# Patient Record
Sex: MALE | Race: WHITE | NOT HISPANIC OR LATINO | Employment: OTHER | URBAN - METROPOLITAN AREA
[De-identification: names, ages, dates, MRNs, and addresses within clinical notes are randomized per-mention and may not be internally consistent; named-entity substitution may affect disease eponyms.]

---

## 2018-06-22 ENCOUNTER — HOSPITAL ENCOUNTER (EMERGENCY)
Facility: HOSPITAL | Age: 58
Discharge: ANOTHER HOSPITAL | End: 2018-06-22
Attending: INTERNAL MEDICINE

## 2018-06-22 ENCOUNTER — HOSPITAL ENCOUNTER (INPATIENT)
Facility: HOSPITAL | Age: 58
LOS: 2 days | Discharge: HOME | DRG: 683 | End: 2018-06-24
Attending: EMERGENCY MEDICINE | Admitting: HOSPITALIST

## 2018-06-22 VITALS
BODY MASS INDEX: 22.35 KG/M2 | HEIGHT: 72 IN | TEMPERATURE: 98 F | DIASTOLIC BLOOD PRESSURE: 78 MMHG | SYSTOLIC BLOOD PRESSURE: 118 MMHG | HEART RATE: 86 BPM | WEIGHT: 165 LBS | OXYGEN SATURATION: 100 %

## 2018-06-22 DIAGNOSIS — N17.9 ACUTE RENAL FAILURE, UNSPECIFIED ACUTE RENAL FAILURE TYPE: Primary | ICD-10-CM

## 2018-06-22 DIAGNOSIS — I10 ESSENTIAL HYPERTENSION: ICD-10-CM

## 2018-06-22 DIAGNOSIS — M62.82 NON-TRAUMATIC RHABDOMYOLYSIS: ICD-10-CM

## 2018-06-22 DIAGNOSIS — R55 SYNCOPE: ICD-10-CM

## 2018-06-22 DIAGNOSIS — R06.00 DYSPNEA: ICD-10-CM

## 2018-06-22 DIAGNOSIS — N17.9 AKI (ACUTE KIDNEY INJURY): Primary | ICD-10-CM

## 2018-06-22 PROBLEM — D64.9 ANEMIA: Status: ACTIVE | Noted: 2018-06-22

## 2018-06-22 LAB
ABO + RH BLD: NORMAL
ALBUMIN SERPL BCP-MCNC: 4.5 G/DL
ALP SERPL-CCNC: 54 U/L
ALT SERPL W/O P-5'-P-CCNC: 22 U/L
ANION GAP SERPL CALC-SCNC: 18 MMOL/L
ANION GAP SERPL CALC-SCNC: 19 MMOL/L
AST SERPL-CCNC: 19 U/L
BACTERIA #/AREA URNS HPF: ABNORMAL /HPF
BACTERIA #/AREA URNS HPF: NORMAL /HPF
BASOPHILS # BLD AUTO: 0.05 K/UL
BASOPHILS NFR BLD: 0.4 %
BILIRUB SERPL-MCNC: 0.7 MG/DL
BILIRUB UR QL STRIP: ABNORMAL
BILIRUB UR QL STRIP: NEGATIVE
BLD GP AB SCN CELLS X3 SERPL QL: NORMAL
BNP SERPL-MCNC: 12 PG/ML
BUN SERPL-MCNC: 62 MG/DL
BUN SERPL-MCNC: 70 MG/DL
CALCIUM SERPL-MCNC: 10 MG/DL
CALCIUM SERPL-MCNC: 9.1 MG/DL
CHLORIDE SERPL-SCNC: 93 MMOL/L
CHLORIDE SERPL-SCNC: 99 MMOL/L
CK SERPL-CCNC: 346 U/L
CLARITY UR: CLEAR
CLARITY UR: CLEAR
CO2 SERPL-SCNC: 19 MMOL/L
CO2 SERPL-SCNC: 21 MMOL/L
COLOR UR: YELLOW
COLOR UR: YELLOW
CREAT SERPL-MCNC: 6.6 MG/DL
CREAT SERPL-MCNC: 9.2 MG/DL
CREAT UR-MCNC: 218.5 MG/DL
DIFFERENTIAL METHOD: ABNORMAL
EOSINOPHIL # BLD AUTO: 0.2 K/UL
EOSINOPHIL NFR BLD: 1.4 %
ERYTHROCYTE [DISTWIDTH] IN BLOOD BY AUTOMATED COUNT: 12.7 %
EST. GFR  (AFRICAN AMERICAN): 10 ML/MIN/1.73 M^2
EST. GFR  (AFRICAN AMERICAN): 6.6 ML/MIN/1.73 M^2
EST. GFR  (NON AFRICAN AMERICAN): 5.7 ML/MIN/1.73 M^2
EST. GFR  (NON AFRICAN AMERICAN): 9 ML/MIN/1.73 M^2
GLUCOSE SERPL-MCNC: 105 MG/DL
GLUCOSE SERPL-MCNC: 110 MG/DL
GLUCOSE UR QL STRIP: NEGATIVE
GLUCOSE UR QL STRIP: NEGATIVE
HCT VFR BLD AUTO: 34.5 %
HGB BLD-MCNC: 12.2 G/DL
HGB UR QL STRIP: ABNORMAL
HGB UR QL STRIP: NEGATIVE
HYALINE CASTS #/AREA URNS LPF: 0 /LPF
HYALINE CASTS #/AREA URNS LPF: 35 /LPF
IMM GRANULOCYTES # BLD AUTO: 0.06 K/UL
IMM GRANULOCYTES NFR BLD AUTO: 0.5 %
INR PPP: 1.1
KETONES UR QL STRIP: ABNORMAL
KETONES UR QL STRIP: ABNORMAL
LEUKOCYTE ESTERASE UR QL STRIP: NEGATIVE
LEUKOCYTE ESTERASE UR QL STRIP: NEGATIVE
LYMPHOCYTES # BLD AUTO: 2.3 K/UL
LYMPHOCYTES NFR BLD: 20.3 %
MCH RBC QN AUTO: 28.8 PG
MCHC RBC AUTO-ENTMCNC: 35.4 G/DL
MCV RBC AUTO: 81 FL
MICROSCOPIC COMMENT: ABNORMAL
MICROSCOPIC COMMENT: NORMAL
MONOCYTES # BLD AUTO: 1.4 K/UL
MONOCYTES NFR BLD: 12.4 %
NEUTROPHILS # BLD AUTO: 7.3 K/UL
NEUTROPHILS NFR BLD: 65 %
NITRITE UR QL STRIP: NEGATIVE
NITRITE UR QL STRIP: NEGATIVE
NRBC BLD-RTO: 0 /100 WBC
PH UR STRIP: 5 [PH] (ref 5–8)
PH UR STRIP: 6 [PH] (ref 5–8)
PLATELET # BLD AUTO: 294 K/UL
PMV BLD AUTO: 10.8 FL
POTASSIUM SERPL-SCNC: 3.4 MMOL/L
POTASSIUM SERPL-SCNC: 3.8 MMOL/L
PROT SERPL-MCNC: 8.6 G/DL
PROT UR QL STRIP: ABNORMAL
PROT UR QL STRIP: ABNORMAL
PROTHROMBIN TIME: 12.4 SEC
RBC # BLD AUTO: 4.24 M/UL
RBC #/AREA URNS HPF: 2 /HPF (ref 0–4)
RBC #/AREA URNS HPF: 3 /HPF (ref 0–4)
SODIUM SERPL-SCNC: 133 MMOL/L
SODIUM SERPL-SCNC: 136 MMOL/L
SODIUM UR-SCNC: 51 MMOL/L
SP GR UR STRIP: 1.02 (ref 1–1.03)
SP GR UR STRIP: >=1.03 (ref 1–1.03)
SQUAMOUS #/AREA URNS HPF: 1 /HPF
SQUAMOUS #/AREA URNS HPF: 3 /HPF
TSH SERPL DL<=0.005 MIU/L-ACNC: 0.69 UIU/ML
URN SPEC COLLECT METH UR: ABNORMAL
URN SPEC COLLECT METH UR: ABNORMAL
UROBILINOGEN UR STRIP-ACNC: NEGATIVE EU/DL
UROBILINOGEN UR STRIP-ACNC: NEGATIVE EU/DL
WBC # BLD AUTO: 11.3 K/UL
WBC #/AREA URNS HPF: 2 /HPF (ref 0–5)
WBC #/AREA URNS HPF: 2 /HPF (ref 0–5)

## 2018-06-22 PROCEDURE — 96360 HYDRATION IV INFUSION INIT: CPT

## 2018-06-22 PROCEDURE — 25000003 PHARM REV CODE 250: Performed by: INTERNAL MEDICINE

## 2018-06-22 PROCEDURE — 36415 COLL VENOUS BLD VENIPUNCTURE: CPT

## 2018-06-22 PROCEDURE — 71045 X-RAY EXAM CHEST 1 VIEW: CPT | Mod: TC,FY

## 2018-06-22 PROCEDURE — 84156 ASSAY OF PROTEIN URINE: CPT

## 2018-06-22 PROCEDURE — 96361 HYDRATE IV INFUSION ADD-ON: CPT

## 2018-06-22 PROCEDURE — 85610 PROTHROMBIN TIME: CPT

## 2018-06-22 PROCEDURE — 80307 DRUG TEST PRSMV CHEM ANLYZR: CPT

## 2018-06-22 PROCEDURE — 12000002 HC ACUTE/MED SURGE SEMI-PRIVATE ROOM

## 2018-06-22 PROCEDURE — 80053 COMPREHEN METABOLIC PANEL: CPT

## 2018-06-22 PROCEDURE — 80048 BASIC METABOLIC PNL TOTAL CA: CPT | Mod: 91

## 2018-06-22 PROCEDURE — 93005 ELECTROCARDIOGRAM TRACING: CPT

## 2018-06-22 PROCEDURE — 82570 ASSAY OF URINE CREATININE: CPT

## 2018-06-22 PROCEDURE — 81000 URINALYSIS NONAUTO W/SCOPE: CPT

## 2018-06-22 PROCEDURE — 82550 ASSAY OF CK (CPK): CPT | Mod: 91

## 2018-06-22 PROCEDURE — 99285 EMERGENCY DEPT VISIT HI MDM: CPT | Mod: 25

## 2018-06-22 PROCEDURE — 84540 ASSAY OF URINE/UREA-N: CPT

## 2018-06-22 PROCEDURE — 25000003 PHARM REV CODE 250: Performed by: NURSE PRACTITIONER

## 2018-06-22 PROCEDURE — 85025 COMPLETE CBC W/AUTO DIFF WBC: CPT

## 2018-06-22 PROCEDURE — 83880 ASSAY OF NATRIURETIC PEPTIDE: CPT

## 2018-06-22 PROCEDURE — 80048 BASIC METABOLIC PNL TOTAL CA: CPT

## 2018-06-22 PROCEDURE — 99285 EMERGENCY DEPT VISIT HI MDM: CPT | Mod: 25,27

## 2018-06-22 PROCEDURE — 84300 ASSAY OF URINE SODIUM: CPT

## 2018-06-22 PROCEDURE — 86901 BLOOD TYPING SEROLOGIC RH(D): CPT

## 2018-06-22 PROCEDURE — 25000003 PHARM REV CODE 250: Performed by: EMERGENCY MEDICINE

## 2018-06-22 PROCEDURE — 84443 ASSAY THYROID STIM HORMONE: CPT

## 2018-06-22 PROCEDURE — 82272 OCCULT BLD FECES 1-3 TESTS: CPT

## 2018-06-22 PROCEDURE — 81000 URINALYSIS NONAUTO W/SCOPE: CPT | Mod: 91

## 2018-06-22 PROCEDURE — 83935 ASSAY OF URINE OSMOLALITY: CPT

## 2018-06-22 PROCEDURE — 71045 X-RAY EXAM CHEST 1 VIEW: CPT | Mod: 26,,, | Performed by: RADIOLOGY

## 2018-06-22 PROCEDURE — 82550 ASSAY OF CK (CPK): CPT

## 2018-06-22 RX ORDER — ONDANSETRON 4 MG/1
8 TABLET, ORALLY DISINTEGRATING ORAL EVERY 8 HOURS PRN
Status: DISCONTINUED | OUTPATIENT
Start: 2018-06-22 | End: 2018-06-24 | Stop reason: HOSPADM

## 2018-06-22 RX ORDER — SODIUM CHLORIDE, SODIUM LACTATE, POTASSIUM CHLORIDE, CALCIUM CHLORIDE 600; 310; 30; 20 MG/100ML; MG/100ML; MG/100ML; MG/100ML
1000 INJECTION, SOLUTION INTRAVENOUS
Status: COMPLETED | OUTPATIENT
Start: 2018-06-22 | End: 2018-06-22

## 2018-06-22 RX ORDER — LISINOPRIL AND HYDROCHLOROTHIAZIDE 10; 12.5 MG/1; MG/1
1 TABLET ORAL 2 TIMES DAILY
Status: ON HOLD | COMMUNITY
End: 2018-06-24 | Stop reason: HOSPADM

## 2018-06-22 RX ORDER — SODIUM CHLORIDE 9 MG/ML
INJECTION, SOLUTION INTRAVENOUS CONTINUOUS
Status: DISCONTINUED | OUTPATIENT
Start: 2018-06-22 | End: 2018-06-24 | Stop reason: HOSPADM

## 2018-06-22 RX ORDER — ACETAMINOPHEN 325 MG/1
650 TABLET ORAL EVERY 6 HOURS PRN
Status: DISCONTINUED | OUTPATIENT
Start: 2018-06-22 | End: 2018-06-24 | Stop reason: HOSPADM

## 2018-06-22 RX ORDER — SODIUM CHLORIDE 9 MG/ML
1000 INJECTION, SOLUTION INTRAVENOUS
Status: COMPLETED | OUTPATIENT
Start: 2018-06-22 | End: 2018-06-22

## 2018-06-22 RX ORDER — IPRATROPIUM BROMIDE AND ALBUTEROL SULFATE 2.5; .5 MG/3ML; MG/3ML
3 SOLUTION RESPIRATORY (INHALATION) EVERY 4 HOURS PRN
Status: DISCONTINUED | OUTPATIENT
Start: 2018-06-22 | End: 2018-06-24 | Stop reason: HOSPADM

## 2018-06-22 RX ORDER — SODIUM CHLORIDE 0.9 % (FLUSH) 0.9 %
5 SYRINGE (ML) INJECTION
Status: DISCONTINUED | OUTPATIENT
Start: 2018-06-22 | End: 2018-06-24 | Stop reason: HOSPADM

## 2018-06-22 RX ORDER — AMLODIPINE BESYLATE 5 MG/1
5 TABLET ORAL DAILY
Status: ON HOLD | COMMUNITY
End: 2018-06-24 | Stop reason: HOSPADM

## 2018-06-22 RX ORDER — AMOXICILLIN 250 MG
1 CAPSULE ORAL 2 TIMES DAILY PRN
Status: DISCONTINUED | OUTPATIENT
Start: 2018-06-22 | End: 2018-06-24 | Stop reason: HOSPADM

## 2018-06-22 RX ORDER — RAMELTEON 8 MG/1
8 TABLET ORAL NIGHTLY PRN
Status: DISCONTINUED | OUTPATIENT
Start: 2018-06-22 | End: 2018-06-24 | Stop reason: HOSPADM

## 2018-06-22 RX ORDER — METOPROLOL TARTRATE 50 MG/1
50 TABLET ORAL 2 TIMES DAILY
Status: ON HOLD | COMMUNITY
End: 2018-06-24 | Stop reason: HOSPADM

## 2018-06-22 RX ADMIN — SODIUM CHLORIDE 1000 ML: 0.9 INJECTION, SOLUTION INTRAVENOUS at 03:06

## 2018-06-22 RX ADMIN — SODIUM CHLORIDE, SODIUM LACTATE, POTASSIUM CHLORIDE, AND CALCIUM CHLORIDE 1000 ML: .6; .31; .03; .02 INJECTION, SOLUTION INTRAVENOUS at 08:06

## 2018-06-22 RX ADMIN — SODIUM CHLORIDE 1000 ML: 0.9 INJECTION, SOLUTION INTRAVENOUS at 05:06

## 2018-06-22 RX ADMIN — SODIUM CHLORIDE: 0.9 INJECTION, SOLUTION INTRAVENOUS at 10:06

## 2018-06-22 NOTE — ED NOTES
[facility] ED to ED transfer.

## 2018-06-22 NOTE — ED PROVIDER NOTES
Encounter Date: 6/22/2018       History     Chief Complaint   Patient presents with    States he has been blacking out off and on for about a week     This patient has been having severe weakness for approximately 1 week with postural hypotension symptoms as well as syncope with loss of consciousness he states he has been markedly dyspneic on exertion and weak and can barely walk across a room 40 feels like is going to pass out.  He has not had any black tarry stools although he has been taking ibuprofen daily for some period of time for various aches and pains.  He takes up to 4-6 ibuprofen per day.  He has no history of any cardiac history of problems and he has not had any chest pain is not experience any palpitations or arrhythmias and no other cardiac symptoms. He has no history of muscular disease and although his muscles give out easily he feels like this is more of a weakness. He has not had any melena by history.  He does have hypertension and takes a medication for his hypertension but he is not sure of the name.          Review of patient's allergies indicates:  No Known Allergies  Past Medical History:   Diagnosis Date    Hypertension      History reviewed. No pertinent surgical history.  History reviewed. No pertinent family history.  Social History   Substance Use Topics    Smoking status: Current Every Day Smoker    Smokeless tobacco: Not on file    Alcohol use No     Review of Systems   Constitutional: Positive for appetite change (He has had a slight change in appetite but he has not lost any significant amount await he has felt nauseous recently) and fatigue ( he also reports severe fatigue but the fatigue is more accurately described as weakness with exertion). Negative for fever.   HENT: Negative for sore throat.    Respiratory: Positive for shortness of breath.    Cardiovascular: Negative for chest pain.   Gastrointestinal: Negative for nausea.   Genitourinary: Negative for dysuria.    Musculoskeletal: Negative for back pain.   Skin: Negative for rash.   Neurological: Positive for syncope ( complete syncopal episodes at least 3 occasions with loss of consciousness he did not have any epileptic or other seizure-like activity and several of these will witnessed once he passes out he is knees with the knee and on the ground immediately wakes up) and light-headedness. Negative for weakness.   Hematological: Does not bruise/bleed easily.   All other systems reviewed and are negative.      Physical Exam     Initial Vitals [06/22/18 1404]   BP Pulse Resp Temp SpO2   100/68 (!) 111 20 98.4 °F (36.9 °C) 99 %      MAP       --         Physical Exam    Nursing note and vitals reviewed.  Constitutional: Vital signs are normal. He appears well-developed and well-nourished. He is active and cooperative.   HENT:   Head: Normocephalic and atraumatic.   Eyes: Conjunctivae and lids are normal. Lids are everted and swept, no foreign bodies found.   Neck: Trachea normal, normal range of motion and full passive range of motion without pain. Neck supple.   Cardiovascular: Regular rhythm, S1 normal, S2 normal, normal heart sounds, intact distal pulses and normal pulses.  No extrasystoles are present. Exam reveals no gallop and no friction rub.    No murmur heard.  Patient is tachycardic and slightly hypotensive   Pulmonary/Chest: Breath sounds normal.   Abdominal: Soft. Normal appearance and bowel sounds are normal.   Genitourinary: Rectal exam shows guaiac positive stool (Patient had an attempted guaiac however there was no stool in the rectal vault and he had no from normal prostate appear there was no evidence of bleeding and the maria ines anal area). Guaiac positive stool (Patient had an attempted guaiac however there was no stool in the rectal vault and he had no from normal prostate appear there was no evidence of bleeding and the maria ines anal area). : Acceptable.  Musculoskeletal: Normal range of  motion.   Neurological: He is alert and oriented to person, place, and time. He has normal strength and normal reflexes. GCS eye subscore is 4. GCS verbal subscore is 5. GCS motor subscore is 6.   Skin: Skin is warm, dry and intact. Capillary refill takes less than 2 seconds. There is pallor.   Psychiatric: He has a normal mood and affect. His speech is normal and behavior is normal. Judgment and thought content normal. Cognition and memory are normal.         ED Course   Procedures  Labs Reviewed   CBC W/ AUTO DIFFERENTIAL - Abnormal; Notable for the following:        Result Value    RBC 4.24 (*)     Hemoglobin 12.2 (*)     Hematocrit 34.5 (*)     MCV 81 (*)     Immature Grans (Abs) 0.06 (*)     Mono # 1.4 (*)     All other components within normal limits   COMPREHENSIVE METABOLIC PANEL - Abnormal; Notable for the following:     Sodium 133 (*)     Potassium 3.4 (*)     Chloride 93 (*)     CO2 21 (*)     BUN, Bld 70 (*)     Creatinine 9.2 (*)     Total Protein 8.6 (*)     Alkaline Phosphatase 54 (*)     Anion Gap 19 (*)     eGFR if  6.6 (*)     eGFR if non  5.7 (*)     All other components within normal limits   URINALYSIS, REFLEX TO URINE CULTURE - Abnormal; Notable for the following:     Specific Gravity, UA >=1.030 (*)     Protein, UA 2+ (*)     Ketones, UA 1+ (*)     Bilirubin (UA) 1+ (*)     All other components within normal limits    Narrative:     Preferred Collection Type->Urine, Clean Catch   B-TYPE NATRIURETIC PEPTIDE   PROTIME-INR   TSH   URINALYSIS MICROSCOPIC    Narrative:     Preferred Collection Type->Urine, Clean Catch   CK   TYPE & SCREEN     EKG Readings: (Independently Interpreted)   Initial Reading: No STEMI. Rhythm: Sinus Tachycardia. Ectopy: No Ectopy. Conduction: Normal. ST Segments: Normal ST Segments. T Waves: Normal. Axis: Normal.       Imaging Results    None                            ED Course as of Jun 22 1721 Fri Jun 22, 2018   1703 Further history is  patient has been working outside and overheated for a while. He tries to stay hydrated and did not feel behind on fluids. [facility] transfer called at 1700 for nephrology referral and transfer.   [SO]   1704 Patient explained situation and need for transfer. Agreeable.   [SO]      ED Course User Index  [SO] Jani Meyer MD     Clinical Impression:   The primary encounter diagnosis was Acute renal failure, unspecified acute renal failure type. Diagnoses of Dyspnea and Non-traumatic rhabdomyolysis were also pertinent to this visit.                             Jani Meyer MD  06/22/18 7894

## 2018-06-23 PROBLEM — I50.30 DIASTOLIC HEART FAILURE: Status: ACTIVE | Noted: 2018-06-23

## 2018-06-23 LAB
ALBUMIN SERPL BCP-MCNC: 3.3 G/DL
ALP SERPL-CCNC: 51 U/L
ALT SERPL W/O P-5'-P-CCNC: 14 U/L
AMPHET+METHAMPHET UR QL: NORMAL
ANION GAP SERPL CALC-SCNC: 11 MMOL/L
ANION GAP SERPL CALC-SCNC: 11 MMOL/L
ANION GAP SERPL CALC-SCNC: 12 MMOL/L
ANION GAP SERPL CALC-SCNC: 7 MMOL/L
AORTIC ROOT ANNULUS: 3.09 CM
AORTIC VALVE CUSP SEPERATION: 1.92 CM
AST SERPL-CCNC: 13 U/L
AV MEAN GRADIENT: 5.15 MMHG
AV PEAK GRADIENT: 8.23 MMHG
AV VALVE AREA: 2.06 CM2
BARBITURATES UR QL SCN>200 NG/ML: NEGATIVE
BASOPHILS # BLD AUTO: 0 K/UL
BASOPHILS NFR BLD: 0.5 %
BENZODIAZ UR QL SCN>200 NG/ML: NEGATIVE
BILIRUB SERPL-MCNC: 0.5 MG/DL
BSA FOR ECHO PROCEDURE: 1.91 M2
BUN SERPL-MCNC: 50 MG/DL
BUN SERPL-MCNC: 61 MG/DL
BUN SERPL-MCNC: 61 MG/DL
BUN SERPL-MCNC: 64 MG/DL
BZE UR QL SCN: NEGATIVE
CALCIUM SERPL-MCNC: 8.5 MG/DL
CALCIUM SERPL-MCNC: 8.6 MG/DL
CANNABINOIDS UR QL SCN: NEGATIVE
CHLORIDE SERPL-SCNC: 102 MMOL/L
CHLORIDE SERPL-SCNC: 105 MMOL/L
CHLORIDE SERPL-SCNC: 105 MMOL/L
CHLORIDE SERPL-SCNC: 108 MMOL/L
CK SERPL-CCNC: 184 U/L
CK SERPL-CCNC: 212 U/L
CK SERPL-CCNC: 239 U/L
CO2 SERPL-SCNC: 23 MMOL/L
CO2 SERPL-SCNC: 24 MMOL/L
CREAT SERPL-MCNC: 2 MG/DL
CREAT SERPL-MCNC: 3.3 MG/DL
CREAT SERPL-MCNC: 3.3 MG/DL
CREAT SERPL-MCNC: 4.8 MG/DL
CREAT UR-MCNC: 219 MG/DL
CREAT UR-MCNC: 219 MG/DL
CV ECHO LV RWT: 0.36 CM
DIFFERENTIAL METHOD: ABNORMAL
DOP CALC AO PEAK VEL: 1.43 M/S
DOP CALC AO VTI: 26.5 CM
DOP CALC LVOT AREA: 2.52 CM2
DOP CALC LVOT DIAMETER: 1.79 CM
DOP CALC LVOT STROKE VOLUME: 54.48 CM3
DOP CALCLVOT PEAK VEL VTI: 21.66 CM
E WAVE DECELERATION TIME: 267.34 MSEC
E/A RATIO: 0.88
E/E' RATIO: 6.86
ECHO LV POSTERIOR WALL: 0.91 CM (ref 0.6–1.1)
EOSINOPHIL # BLD AUTO: 0.2 K/UL
EOSINOPHIL NFR BLD: 3.1 %
ERYTHROCYTE [DISTWIDTH] IN BLOOD BY AUTOMATED COUNT: 13.5 %
EST. GFR  (AFRICAN AMERICAN): 15 ML/MIN/1.73 M^2
EST. GFR  (AFRICAN AMERICAN): 23 ML/MIN/1.73 M^2
EST. GFR  (AFRICAN AMERICAN): 23 ML/MIN/1.73 M^2
EST. GFR  (AFRICAN AMERICAN): 42 ML/MIN/1.73 M^2
EST. GFR  (NON AFRICAN AMERICAN): 13 ML/MIN/1.73 M^2
EST. GFR  (NON AFRICAN AMERICAN): 20 ML/MIN/1.73 M^2
EST. GFR  (NON AFRICAN AMERICAN): 20 ML/MIN/1.73 M^2
EST. GFR  (NON AFRICAN AMERICAN): 36 ML/MIN/1.73 M^2
ESTIMATED AVG GLUCOSE: 126 MG/DL
ETHANOL UR-MCNC: <10 MG/DL
FERRITIN SERPL-MCNC: 250 NG/ML
FRACTIONAL SHORTENING: 39 % (ref 28–44)
GLUCOSE SERPL-MCNC: 143 MG/DL
GLUCOSE SERPL-MCNC: 85 MG/DL
GLUCOSE SERPL-MCNC: 88 MG/DL
GLUCOSE SERPL-MCNC: 88 MG/DL
HBA1C MFR BLD HPLC: 6 %
HCT VFR BLD AUTO: 30.6 %
HGB BLD-MCNC: 10.2 G/DL
INTERVENTRICULAR SEPTUM: 0.85 CM (ref 0.6–1.1)
IRON SERPL-MCNC: 40 UG/DL
IVRT: 0.06 MSEC
LEFT ATRIUM SIZE: 4.03 CM
LEFT INTERNAL DIMENSION IN SYSTOLE: 2.96 CM (ref 2.1–4)
LEFT VENTRICLE MASS INDEX: 77.2 G/M2
LEFT VENTRICULAR INTERNAL DIMENSION IN DIASTOLE: 4.88 CM (ref 3.5–6)
LEFT VENTRICULAR MASS: 147.48 G
LV LATERAL E/E' RATIO: 6.55
LV SEPTAL E/E' RATIO: 7.2
LYMPHOCYTES # BLD AUTO: 1.9 K/UL
LYMPHOCYTES NFR BLD: 29.9 %
MAGNESIUM SERPL-MCNC: 2.2 MG/DL
MCH RBC QN AUTO: 28.6 PG
MCHC RBC AUTO-ENTMCNC: 33.5 G/DL
MCV RBC AUTO: 85 FL
METHADONE UR QL SCN>300 NG/ML: NEGATIVE
MONOCYTES # BLD AUTO: 1 K/UL
MONOCYTES NFR BLD: 15.9 %
MV PEAK A VEL: 0.82 M/S
MV PEAK E VEL: 0.72 M/S
MV STENOSIS PRESSURE HALF TIME: 77.53 MS
MV VALVE AREA P 1/2 METHOD: 2.84 CM2
NEUTROPHILS # BLD AUTO: 3.3 K/UL
NEUTROPHILS NFR BLD: 50.6 %
OPIATES UR QL SCN: NEGATIVE
OSMOLALITY UR: 317 MOSM/KG
PCP UR QL SCN>25 NG/ML: NEGATIVE
PHOSPHATE SERPL-MCNC: 3.7 MG/DL
PISA TR MAX VEL: 2.58 M/S
PLATELET # BLD AUTO: 235 K/UL
PMV BLD AUTO: 8.1 FL
POTASSIUM SERPL-SCNC: 3.1 MMOL/L
POTASSIUM SERPL-SCNC: 3.5 MMOL/L
POTASSIUM SERPL-SCNC: 3.5 MMOL/L
POTASSIUM SERPL-SCNC: 3.9 MMOL/L
PROT SERPL-MCNC: 6.4 G/DL
PROT UR-MCNC: 45 MG/DL
PROT/CREAT RATIO, UR: 0.21
PV PEAK GRADIENT: 5.74 MMHG
PV PEAK VELOCITY: 1.2 CM/S
RBC # BLD AUTO: 3.58 M/UL
RIGHT VENTRICULAR END-DIASTOLIC DIMENSION: 2.45 CM
SATURATED IRON: 15 %
SODIUM SERPL-SCNC: 137 MMOL/L
SODIUM SERPL-SCNC: 139 MMOL/L
TDI LATERAL: 0.11
TDI SEPTAL: 0.1
TDI: 0.11
TOTAL IRON BINDING CAPACITY: 265 UG/DL
TOXICOLOGY INFORMATION: NORMAL
TR MAX PG: 26.63 MMHG
TRANSFERRIN SERPL-MCNC: 179 MG/DL
TRICUSPID ANNULAR PLANE SYSTOLIC EXCURSION: 0.03 CM
UUN UR-MCNC: 335 MG/DL
WBC # BLD AUTO: 6.5 K/UL

## 2018-06-23 PROCEDURE — 36415 COLL VENOUS BLD VENIPUNCTURE: CPT

## 2018-06-23 PROCEDURE — 84100 ASSAY OF PHOSPHORUS: CPT

## 2018-06-23 PROCEDURE — 12000002 HC ACUTE/MED SURGE SEMI-PRIVATE ROOM

## 2018-06-23 PROCEDURE — 80048 BASIC METABOLIC PNL TOTAL CA: CPT

## 2018-06-23 PROCEDURE — 94761 N-INVAS EAR/PLS OXIMETRY MLT: CPT

## 2018-06-23 PROCEDURE — 83735 ASSAY OF MAGNESIUM: CPT

## 2018-06-23 PROCEDURE — 83036 HEMOGLOBIN GLYCOSYLATED A1C: CPT

## 2018-06-23 PROCEDURE — 25000003 PHARM REV CODE 250: Performed by: NURSE PRACTITIONER

## 2018-06-23 PROCEDURE — 82728 ASSAY OF FERRITIN: CPT

## 2018-06-23 PROCEDURE — 80053 COMPREHEN METABOLIC PANEL: CPT

## 2018-06-23 PROCEDURE — 99900035 HC TECH TIME PER 15 MIN (STAT)

## 2018-06-23 PROCEDURE — 85025 COMPLETE CBC W/AUTO DIFF WBC: CPT

## 2018-06-23 PROCEDURE — 82550 ASSAY OF CK (CPK): CPT

## 2018-06-23 PROCEDURE — 83540 ASSAY OF IRON: CPT

## 2018-06-23 RX ORDER — POTASSIUM CHLORIDE 20 MEQ/1
40 TABLET, EXTENDED RELEASE ORAL ONCE
Status: COMPLETED | OUTPATIENT
Start: 2018-06-23 | End: 2018-06-23

## 2018-06-23 RX ADMIN — POTASSIUM CHLORIDE 40 MEQ: 1500 TABLET, EXTENDED RELEASE ORAL at 01:06

## 2018-06-23 NOTE — ED PROVIDER NOTES
"Encounter Date: 6/22/2018    SCRIBE #1 NOTE: I, Brandon Arnett, am scribing for, and in the presence of, Dr. Mcclellan .       History     Chief Complaint   Patient presents with    Loss of Consciousness     C/o syncopal episodes x 1 week. Transferred here from [facility] for Nephrology.      Time seen by provider: 7:29 PM on 06/22/2018    Chief Complaint: ROSETTA Mathur is a 57 y.o. male with a PMHx of HTN who presents to the ED via EMS as a transfer from [facility] for TIN. Patient reports that for the last week he has been having syncopal episodes at his home for the last week and has let it gone unseen until last night with a friend. 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 He admits that he attempts to rehydrate with Powerade and water, but it is not enough. Patient admits that he has been taking 4-5 more ibuprofen daily within the last week. He takes lisinopril and hydrochlorothiazide for his HTN. Patient states that he has found that his urine is darker and it is less in output. Patient states that he went to [place], when he was diagnosed with TIN due to his pertinent labs being a BUN 70, creatine 9.2, GFR 5.7, and a CPK of 346. Patient reports that he received 2 liters of fluid at [place]. He denies chest pain, SOB, abdominal pain, nausea, vomiting, and diarrhea.       The history is provided by the patient.     Review of patient's allergies indicates:  No Known Allergies  Past Medical History:   Diagnosis Date    Hypertension      History reviewed. No pertinent surgical history.  Family History   Problem Relation Age of Onset    Cancer Mother     Diabetes Mother     Hypertension Mother     Cancer Father     Diabetes Father     Hypertension Father     Kidney disease Father      Social History   Substance Use Topics    Smoking status: Current Every Day Smoker    Smokeless tobacco: Not on file    " Alcohol use No     Review of Systems  REVIEW OF SYSTEMS  CONSTITUTIONAL: Negative for fever.  HEENT:  Negative for sore throat.   HEART:   Negative for chest pain..  LUNG:  Negative for shortness of breath.  ABDOMEN:  Negative for nausea, vomiting, diarrhea, and abdominal pain.    :  No discharge, dysuria. +decreased urine output and hesitancy   EXTREMITIES:  No swelling  NEURO:  Negative for weakness.   SKIN:  Negative for rash.  Psych: No depression  HEME: Does not bruise/bleed easily.           Physical Exam     Initial Vitals   BP Pulse Resp Temp SpO2   06/22/18 0859 06/22/18 0859 06/22/18 1850 06/22/18 1902 06/22/18 1850   (!) 117/4 84 18 97.5 °F (36.4 °C) 100 %      MAP       --                Physical Exam    Constitutional: He appears well-developed and well-nourished.  Non-toxic appearance. No distress.   HENT:   Head: Normocephalic and atraumatic.   Mouth/Throat: Mucous membranes are dry.   Slightly dry    Eyes: EOM are normal. Pupils are equal, round, and reactive to light.   Neck: Normal range of motion. Neck supple. No neck rigidity. No JVD present.   Cardiovascular: Normal rate, regular rhythm, normal heart sounds and intact distal pulses. Exam reveals no gallop and no friction rub.    No murmur heard.  Pulmonary/Chest: Breath sounds normal. He has no wheezes. He has no rhonchi. He has no rales.   Abdominal: Soft. Bowel sounds are normal. He exhibits no distension. There is no tenderness. There is no rigidity, no rebound and no guarding.   Musculoskeletal: Normal range of motion.   Neurological: He is alert and oriented to person, place, and time. He has normal strength and normal reflexes. No cranial nerve deficit or sensory deficit. He exhibits normal muscle tone. Coordination normal. GCS eye subscore is 4. GCS verbal subscore is 5. GCS motor subscore is 6.   Skin: Skin is warm and dry.   Psychiatric: He has a normal mood and affect. His speech is normal and behavior is normal. He is not actively  hallucinating.         ED Course   Procedures  Labs Reviewed   BASIC METABOLIC PANEL - Abnormal; Notable for the following:        Result Value    CO2 19 (*)     BUN, Bld 62 (*)     Creatinine 6.6 (*)     Anion Gap 18 (*)     eGFR if  10 (*)     eGFR if non  9 (*)     All other components within normal limits   URINALYSIS - Abnormal; Notable for the following:     Protein, UA Trace (*)     Ketones, UA Trace (*)     Occult Blood UA 1+ (*)     All other components within normal limits   URINALYSIS MICROSCOPIC - Abnormal; Notable for the following:     Hyaline Casts, UA 35 (*)     All other components within normal limits   CREATININE, URINE, RANDOM    Narrative:     Please add on to UA if possible   SODIUM, URINE, RANDOM    Narrative:     Please add on to UA if possible   UREA NITROGEN, URINE, RANDOM   BASIC METABOLIC PANEL   CK   TOXICOLOGY SCREEN, URINE, RANDOM (COMPLIANCE)   PROTEIN / CREATININE RATIO, URINE   OSMOLALITY, URINE RANDOM     EKG Readings: (Independently Interpreted)   NSR at 77 bpm with LVH. Normal axis and ST segments        Imaging Results          US Retroperitoneal Complete (Kidney and (In process)                  Medical Decision Making:   History:   Old Medical Records: I decided to obtain old medical records.  Initial Assessment:   Patient is a 57-year-old man transferred from [place] for evaluation Nephrology for acute kidney insufficiency.  Patient had elevated BUN of 70 and creatinine of 9.  Sodium 133 with no evidence of hyperkalemia.  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  He is given 2 L of normal saline at [place] prior to transfer.  Repeat creatinine is 6.6 with BUN of 62.  Discussed with the hospitalist who agrees to admit the patient for IV fluid hydration and evaluation of his a KI,  Likely  multifactorial.  Clinical Tests:   Lab Tests: Ordered and Reviewed            Scribe Attestation:   Scribe #1: I performed the above scribed service and the documentation accurately describes the services I performed. I attest to the accuracy of the note.    I, Eleuterio Gary, personally performed the services described in this documentation. All medical record entries made by the scribe were at my direction and in my presence.  I have reviewed the chart and agree that the record reflects my personal performance and is accurate and complete. Derek Mcclellan MD.  10:18 PM 06/22/2018             Clinical Impression:   The primary encounter diagnosis was TIN (acute kidney injury). A diagnosis of Syncope was also pertinent to this visit.      Disposition:   Disposition: Admitted                        Derek Mcclellan MD  06/22/18 4708

## 2018-06-23 NOTE — H&P
[facility] Hospital Medicine  History & Physical    Patient Name: Alphonso Mathur  MRN: [id number]  Admission Date: 6/22/2018  Attending Physician: Derek Mcclellan MD   Primary Care Provider: Primary Doctor No         Patient information was obtained from patient and ER records.     Subjective:     Principal Problem:TIN (acute kidney injury)    Chief Complaint:   Chief Complaint   Patient presents with    Loss of Consciousness     C/o syncopal episodes x 1 week. Transferred here from [facility] for Nephrology.         HPI: 58 y/o gentleman, who was transferred from [facility] to [facility] d/t TIN and syncope.  He has a PMH of HTN.  He reports over the past week he has been feeling fatigued and lightheaded.  He endorses dizziness and near syncope with position changes over the same period of time.  Additionally, over the past two days he has had two witnessed syncopal events.  He denies recent illness, fever, chills, CP, N/V/D.  He endorses SOB with activity, fatigue, decreased urine production, and dark concentrated urine.  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  He states that he consumes 1-2 poweraides daily, but states that he does not drink much water.  Additionally, he reports increasing ibuprofen usage recently for aches and pains.  Initial workup reveals afebrile, no leukocytosis, Crt 9.2, BUN 70, , K 3.4.  He received 2L fluid challenge while at [facility] and one additional liter of LR while in the ER.  Repeat labs Crt 6.6, BUN 62.     Past Medical History:   Diagnosis Date    Hypertension        History reviewed. No pertinent surgical history.    Review of patient's allergies indicates:  No Known Allergies    Current Facility-Administered Medications on File Prior to Encounter   Medication    [COMPLETED] 0.9%  NaCl infusion    [COMPLETED] sodium chloride 0.9% bolus 1,000 mL     No current outpatient  prescriptions on file prior to encounter.     Family History     Problem Relation (Age of Onset)    Cancer Mother, Father    Diabetes Mother, Father    Hypertension Mother, Father    Kidney disease Father        Social History Main Topics    Smoking status: Current Every Day Smoker    Smokeless tobacco: Not on file    Alcohol use No    Drug use: No    Sexual activity: Not Currently     Review of Systems   Constitutional: Positive for fatigue. Negative for chills and fever.   HENT: Negative for congestion and sore throat.    Eyes: Negative for discharge and visual disturbance.   Respiratory: Positive for shortness of breath. Negative for cough and chest tightness.    Cardiovascular: Negative for chest pain, palpitations and leg swelling.   Gastrointestinal: Negative for abdominal distention, abdominal pain, diarrhea, nausea and vomiting.   Genitourinary: Positive for decreased urine volume. Negative for dysuria.        Reports dark concentrated urine    Musculoskeletal: Negative for back pain and joint swelling.   Skin: Negative.    Neurological: Positive for dizziness and light-headedness. Negative for seizures and syncope.   Psychiatric/Behavioral: Positive for confusion and decreased concentration.     Objective:     Vital Signs (Most Recent):  Temp: 97.5 °F (36.4 °C) (06/22/18 1902)  Pulse: 78 (06/22/18 1908)  Resp: 18 (06/22/18 1850)  BP: 136/80 (06/22/18 1903)  SpO2: 100 % (06/22/18 1908) Vital Signs (24h Range):  Temp:  [97.5 °F (36.4 °C)-98.4 °F (36.9 °C)] 97.5 °F (36.4 °C)  Pulse:  [] 78  Resp:  [0-20] 18  SpO2:  [97 %-100 %] 100 %  BP: (100-136)/(68-80) 136/80     Weight: 78.5 kg (173 lb)  Body mass index is 23.46 kg/m².    Physical Exam   Constitutional: He is oriented to person, place, and time. He appears well-developed and well-nourished. No distress.   HENT:   Head: Normocephalic and atraumatic.   Eyes: EOM are normal. Pupils are equal, round, and reactive to light.   Neck: Normal range of  motion. Neck supple.   Cardiovascular: Normal rate, regular rhythm, normal heart sounds and intact distal pulses.    No murmur heard.  Pulmonary/Chest: Effort normal and breath sounds normal. No respiratory distress. He has no wheezes. He has no rales.   Abdominal: Soft. Bowel sounds are normal. He exhibits no distension. There is no tenderness.   Musculoskeletal: Normal range of motion. He exhibits no edema.   Neurological: He is alert and oriented to person, place, and time.   Skin: Skin is warm and dry. He is not diaphoretic.   Psychiatric: He has a normal mood and affect. His behavior is normal.   Nursing note and vitals reviewed.        CRANIAL NERVES     CN III, IV, VI   Pupils are equal, round, and reactive to light.  Extraocular motions are normal.        Significant Labs:   CBC:   Recent Labs  Lab 06/22/18  1500   WBC 11.30   HGB 12.2*   HCT 34.5*        CMP:   Recent Labs  Lab 06/22/18  1500 06/22/18  1953   * 136   K 3.4* 3.8   CL 93* 99   CO2 21* 19*    105   BUN 70* 62*   CREATININE 9.2* 6.6*   CALCIUM 10.0 9.1   PROT 8.6*  --    ALBUMIN 4.5  --    BILITOT 0.7  --    ALKPHOS 54*  --    AST 19  --    ALT 22  --    ANIONGAP 19* 18*   EGFRNONAA 5.7* 9*     Cardiac Markers:   Recent Labs  Lab 06/22/18  1500   BNP 12   CPK    346    Coagulation:   Recent Labs  Lab 06/22/18  1500   INR 1.1     TSH:   Recent Labs  Lab 06/22/18  1500   TSH 0.688     Urine Studies:   Recent Labs  Lab 06/22/18  1508   COLORU Yellow   APPEARANCEUA Clear   PHUR 5.0   SPECGRAV >=1.030*   PROTEINUA 2+*   GLUCUA Negative   KETONESU 1+*   BILIRUBINUA 1+*   OCCULTUA Negative   NITRITE Negative   UROBILINOGEN Negative   LEUKOCYTESUR Negative   RBCUA 2   WBCUA 2   BACTERIA Occasional   SQUAMEPITHEL 3   HYALINECASTS 0       Significant Imaging: I have reviewed all pertinent imaging results/findings within the past 24 hours.    Assessment/Plan:     * TIN (acute kidney injury)    - presents with fatigue, decreased urine  production, and syncope   - likely related to profound dehydration and possibly medication induced   - Crt 9.2 on arrival - repeat down to 6.6 with hydration  - renal US pending  - urine studies requested  -    - trend CPK and chemistry Q 6 for now  - continue MIVF's overnight and reassess fluid status in AM  - strict I/O's  - daily Wt's  - Hold lisinopril and HCTZ   - avoid nephrotoxins  - nephrology consult pending         Syncope    - c/o two witnessed syncopal events and multiple near syncopal events primarily with position changes   - likely orthostatic / volume depletion related  - orthostatic BP'sx1  - s/p 3L's IVF's - reports mild improvement in symptoms  - EKG with no acute ischemic changes appreciated   - maintain on telemetry   - ECHO pending   - hold BP meds for now   - ambulate with assistance only  - high risk of fall / injury - utilize all safety measures and fall precautions        Essential hypertension    - SBP ranging between 100-136  - hold home lisinopril and HCTZ for now  - monitor and consider adding PRN agent if indicated          Anemia    - Hgb 12.2 with initial labs  - iron studies pending  - trend H/H daily          VTE Risk Mitigation         Ordered     IP VTE LOW RISK PATIENT  Once      06/22/18 2130     Place CASSIE hose  Until discontinued      06/22/18 2130     Place sequential compression device  Until discontinued      06/22/18 2130             Brandon Hudson NP  Department of Hospital Medicine   [facility]

## 2018-06-23 NOTE — HPI
56 y/o gentleman, who was transferred from [facility] to [facility] d/t TIN and syncope.  He has a PMH of HTN.  He reports over the past week he has been feeling fatigued and lightheaded.  He endorses dizziness and near syncope with position changes over the same period of time.  Additionally, over the past two days he has had two witnessed syncopal events.  He denies recent illness, fever, chills, CP, N/V/D.  He endorses SOB with activity, fatigue, decreased urine production, and dark concentrated urine.  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  He states that he consumes 1-2 poweraides daily, but states that he does not drink much water.  Additionally, he reports increasing ibuprofen usage recently for aches and pains.  Initial workup reveals afebrile, no leukocytosis, Crt 9.2, BUN 70, , K 3.4.  He received 2L fluid challenge while at [place] and one additional liter of LR while in the ER.  Repeat labs Crt 6.6, BUN 62.

## 2018-06-23 NOTE — CONSULTS
"Consult Note  Nephrology    Consult Requested By: Duncan Troy MD     Reason for Consult: TIN    Chief Complaint   Patient presents with    Loss of Consciousness     C/o syncopal episodes x 1 week. Transferred here from [facility] for Nephrology.        SUBJECTIVE:     History of Present Illness:   55 y/o male patient transferred from [facility] for TIN.  Initial Scr>9.  He reports he has been having dizziness, "black out spells", and nausea for about 10 days.  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  States he doesn't drink fluid "like I should", also, takes multiple ibuprofen daily for headaches.  Denies vomiting and diarrhea.  He denies any exac/reliev factors. We have been consulted for TIN with metabolic derangements.     Assessment/plan:    TIN  NSAID exposure  Hypotension/Volume depletion  Hypokalemia  Anemia    - Suspect prerenal TIN 2/2 hypotension/volume depletion in setting of poor oral intake + ACE/diuretic + NSAIDs. Renal function is rapidly improving with volume repletion. UA + hyaline casts, which is supportive. Renal u/s is unrevealing. Continue NS at current rate. Keep inciting meds on hold.  - BP is improving with NS IVFs. Echo is pending as part of syncope workup.   - He got oral KCl repletion. Mg is replete.   - Hb drop may be dilutional- will trend.     Will continue to follow.     Past Medical History:   Diagnosis Date    Hypertension      History reviewed. No pertinent surgical history.  Family History   Problem Relation Age of Onset    Cancer Mother     Diabetes Mother     Hypertension Mother     Cancer Father     Diabetes Father     Hypertension Father     Kidney disease Father      Social History   Substance Use Topics    Smoking status: Current Every Day Smoker     Packs/day: 0.50     Years: 36.00    Smokeless tobacco: Never Used    Alcohol use No       Review of patient's allergies indicates:  No Known Allergies     Review of Systems:  General " ROS: negative for - fever or night sweats  Psychological ROS: negative for - behavioral disorder or depression  ENT ROS: negative for - headaches or visual changes  Hematological and Lymphatic ROS: negative for - bleeding problems or bruising  Endocrine ROS: negative for - temperature intolerance or unexpected weight changes  Respiratory ROS: no cough, shortness of breath, or wheezing  Cardiovascular ROS: no chest pain or dyspnea on exertion  Gastrointestinal ROS: no abdominal pain, change in bowel habits, or black or bloody stools  Genito-Urinary ROS: no dysuria  Musculoskeletal ROS: negative for - joint pain or joint swelling  Neurological ROS: no TIA or stroke symptoms  Dermatological ROS: negative for rash and skin lesion changes    OBJECTIVE:     Vital Signs Range (Last 24H):  Temp:  [97.5 °F (36.4 °C)-98.4 °F (36.9 °C)]   Pulse:  []   Resp:  [0-20]   BP: ()/(53-83)   SpO2:  [94 %-100 %]     Physical Exam:  General- Patient alert and oriented x3 in NAD  HEENT- WNL  Neck- supple  CV- Regular rate and rhythm, No Murmur/kiko/rubs  Resp- Lungs CTA Bilaterally, No increased WOB  GI- Non tender/non-distended, BS normoactive x4 quads  Extrem- No cyanosis, clubbing, edema. Pulses 2+ and symmetric  Derm- No rashes, masses, or lesions noted  Neuro-  No flap. No focal deficits noted.     Body mass index is 21.56 kg/m².    Laboratory:  CBC:   Recent Labs  Lab 06/23/18  0511   WBC 6.50   RBC 3.58*   HGB 10.2*   HCT 30.6*      MCV 85   MCH 28.6   MCHC 33.5     CMP:   Recent Labs  Lab 06/23/18  0511   GLU 88  88   CALCIUM 8.5*  8.5*   ALBUMIN 3.3*   PROT 6.4     139   K 3.5  3.5   CO2 23  23     105   BUN 61*  61*   CREATININE 3.3*  3.3*   ALKPHOS 51*   ALT 14   AST 13   BILITOT 0.5       Recent Labs  Lab 06/22/18  1905   COLORU Yellow   SPECGRAV 1.020   PHUR 6.0   PROTEINUA Trace*   BACTERIA None   NITRITE Negative   LEUKOCYTESUR Negative   UROBILINOGEN Negative   HYALINECASTS 35*        Diagnostic Results:  Labs: Reviewed   Imaging: Reviewed    Emely Good MD MPH

## 2018-06-23 NOTE — ASSESSMENT & PLAN NOTE
- SBP ranging between 100-136  - hold home lisinopril and HCTZ for now  - monitor and consider adding PRN agent if indicated

## 2018-06-23 NOTE — PROGRESS NOTES
[facility] Medicine  Progress Note    Patient Name: Alphonso Mathur  MRN: [id number]  Patient Class: IP- Inpatient   Admission Date: 6/22/2018  Length of Stay: 1 days  Attending Physician: Duncan Troy MD  Primary Care Provider: Primary Doctor No        Subjective:     Principal Problem:TIN (acute kidney injury)    HPI:  56 y/o gentleman, who was transferred from [facility] to [facility] d/t TIN and syncope.  He has a PMH of HTN.  He reports over the past week he has been feeling fatigued and lightheaded.  He endorses dizziness and near syncope with position changes over the same period of time.  Additionally, over the past two days he has had two witnessed syncopal events.  He denies recent illness, fever, chills, CP, N/V/D.  He endorses SOB with activity, fatigue, decreased urine production, and dark concentrated urine.  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  He states that he consumes 1-2 poweraides daily, but states that he does not drink much water.  Additionally, he reports increasing ibuprofen usage recently for aches and pains.  Initial workup reveals afebrile, no leukocytosis, Crt 9.2, BUN 70, , K 3.4.  He received 2L fluid challenge while at [place] and one additional liter of LR while in the ER.  Repeat labs Crt 6.6, BUN 62.     Hospital Course:  No notes on file    Interval History:  Patient seen and examined.  No acute events overnight.  Patient says that he feels much better and has no further episodes of hypotension noted. Creatinine improving markedly with hydration therapy.    Review of Systems   Constitutional: Negative for chills, fatigue and fever.   Respiratory: Negative for cough and shortness of breath.    Cardiovascular: Negative for chest pain and leg swelling.   Gastrointestinal: Negative for abdominal pain, nausea and vomiting.   Musculoskeletal: Negative for back pain.   Neurological: Negative for  weakness.   Psychiatric/Behavioral: Negative for confusion. The patient is not nervous/anxious.    All other systems reviewed and are negative.    Objective:     Vital Signs (Most Recent):  Temp: 97.6 °F (36.4 °C) (06/23/18 1130)  Pulse: 79 (06/23/18 1130)  Resp: 20 (06/23/18 1130)  BP: 117/77 (06/23/18 1130)  SpO2: 97 % (06/23/18 1130) Vital Signs (24h Range):  Temp:  [97.5 °F (36.4 °C)-98 °F (36.7 °C)] 97.6 °F (36.4 °C)  Pulse:  [72-98] 79  Resp:  [0-20] 20  SpO2:  [94 %-100 %] 97 %  BP: ()/(53-83) 117/77     Weight: 72.1 kg (159 lb)  Body mass index is 21.56 kg/m².    Intake/Output Summary (Last 24 hours) at 06/23/18 1506  Last data filed at 06/23/18 0600   Gross per 24 hour   Intake           1497.5 ml   Output                0 ml   Net           1497.5 ml      Physical Exam   Constitutional: He appears well-developed and well-nourished.   Eyes: EOM are normal. Pupils are equal, round, and reactive to light.   Neck: No JVD present.   Cardiovascular: Normal rate, regular rhythm, normal heart sounds and intact distal pulses.    Pulmonary/Chest: Effort normal and breath sounds normal.   Abdominal: Soft. Bowel sounds are normal. He exhibits no distension. There is no tenderness.   Musculoskeletal: He exhibits no edema or deformity.   Lymphadenopathy:     He has no cervical adenopathy.   Skin: No rash noted. No pallor.   Nursing note and vitals reviewed.      Significant Labs: All pertinent labs within the past 24 hours have been reviewed.    Significant Imaging: I have reviewed all pertinent imaging results/findings within the past 24 hours.    Assessment/Plan:      * TIN (acute kidney injury)    Likely secondary to pre renal azotemia secondary to dehydration.  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  He is responding well to hydration I do not believe he has any other causes.  I have stopped the q6 CPK and BMP so  will just check comprehensive metabolic profile in the morning.  Electrolytes remained stable.  Nephrology has been consulted.  Will defer to them for further evaluation and management.  The patient appears to be doing well, will likely be able to discharge him home in the morning.        Syncope    Likely related to orthostatic hypotension related to renal failure with decreased clearance of antihypertensive medication.  Patient is no longer orthostatic after appropriate hydration.  Will continue to monitor.  Echocardiogram pending.  No signs or symptoms of cardiogenic syncope.        Diastolic heart failure    patient with history of diastolic heart failure.  Echocardiogram is done and pending currently.  Will follow up with echo results and adjust medications accordingly.  Will likely hold ACE-inhibitor if the patient has had recent a KI.          Anemia    Patient's anemia is currently controlled. S/p 0 units of PRBCs. Etiology likely d/t iron deficiency   Current CBC reviewed-   Lab Results   Component Value Date    HGB 10.2 (L) 06/23/2018    HCT 30.6 (L) 06/23/2018     Monitor serial CBC and transfuse if patient becomes hemodynamically unstable, symptomatic or H/H drops below 7/21.           Essential hypertension    patient with episode of syncope likely related to orthostatic hypotension secondary to volume loss.  Will dose adjust blood pressure medications according to patient's volume status and blood pressure at discharge. Continue to hold BP meds for now.          VTE Risk Mitigation         Ordered     IP VTE LOW RISK PATIENT  Once      06/22/18 8097              Duncan Troy MD  Department of Hospital Medicine   [facility]

## 2018-06-23 NOTE — ASSESSMENT & PLAN NOTE
Patient's anemia is currently controlled. S/p 0 units of PRBCs. Etiology likely d/t iron deficiency   Current CBC reviewed-   Lab Results   Component Value Date    HGB 10.2 (L) 06/23/2018    HCT 30.6 (L) 06/23/2018     Monitor serial CBC and transfuse if patient becomes hemodynamically unstable, symptomatic or H/H drops below 7/21.

## 2018-06-23 NOTE — ED NOTES
Pt sent here from [facility] for repeated syncopal episodes this past week.  Pt endorses becoming more SOB and extremely weak with walking short distances.  Pt endorses HTN and admits that he takes medications but doesn't know what medications he takes or what pharmacy is used.  Pt alert and oriented at present.  RR even even and unlabored.  VSWNL at present.  Bed in low and locked position, Bed rails up xt 2 anc call light remains within reach.  Will continue to monitor closely

## 2018-06-23 NOTE — PLAN OF CARE
06/22/18 4684   Patient Assessment/Suction   Level of Consciousness (AVPU) alert   PRE-TX-O2-ETCO2   O2 Device (Oxygen Therapy) room air   SpO2 95 %   Pulse Oximetry Type Intermittent

## 2018-06-23 NOTE — PLAN OF CARE
Discharge planner met with patient at the bedside, Patient denies any inpatient admissions less than 30 days. Patient is independent in ADL's; provides own transportation; Patient says he is unemployed at this time and has no problem affording medication he ay be discharge home with. Verified demographics as not correct; updated  Primary phone to [phone] Acknowledges he has no active PCP at this time and denies needing assistance.  No needs identified at this time, case management to continue to follow.  DC home with self care; sister will provide transportation home.   6/23/18 1121   Discharge Assessment   Assessment Type Discharge Planning Assessment   Confirmed/corrected address and phone number on face sheet? Yes   Assessment information obtained from? Patient    Communicated expected length of stay with patient/caregiver Yes, 3-5 days    Type of Healthcare Directive Received None    If Healthcare Directive is received, is it scanned into Epic? no (comment)   Prior to hospitalization cognitive status: Alert/Oriented   Prior to hospitalization functional status: Independent    Current cognitive status: Alert/Oriented    Current Functional Status: Independent    Arrived From home or self-care   Lives With Sister    Able to Return to Prior Arrangements Yes    Is patient able to care for self after discharge? Yes    Readmission Within The Last 30 Days no previous admission in last 30 days   Patient currently being followed by outpatient case management? No   Patient currently receives home health services? No   Does the patient currently use HME? No   Patient currently receives private duty nursing? No   Patient currently receives any other outside agency services? No   Do you have any problems affording any of your prescribed medications? No    Is the patient taking medications as prescribed? Yes    Do you have any financial concerns preventing you from receiving the healthcare you need? No   Does the patient  have transportation to healthcare appointments? Yes   Transportation Available family or friend will provide   On Dialysis? No   Are there any open cases? No   Discharge Plan A Home with self care    Discharge Plan B Home with family    Patient/Family In Agreement With Plan Yes

## 2018-06-23 NOTE — ASSESSMENT & PLAN NOTE
- presents with fatigue, decreased urine production, and syncope   - likely related to profound dehydration and possibly medication induced   - Crt 9.2 on arrival - repeat down to 6.6 with hydration  - renal US pending  - urine studies requested  -    - trend CPK and chemistry Q 6 for now  - continue MIVF's overnight and reassess fluid status in AM  - strict I/O's  - daily Wt's  - Hold lisinopril and HCTZ   - avoid nephrotoxins  - nephrology consult pending

## 2018-06-23 NOTE — ASSESSMENT & PLAN NOTE
- c/o two witnessed syncopal events and multiple near syncopal events primarily with position changes   - likely orthostatic / volume depletion related  - orthostatic BP'sx1  - s/p 3L's IVF's - reports mild improvement in symptoms  - EKG with no acute ischemic changes appreciated   - maintain on telemetry   - ECHO pending   - hold BP meds for now   - ambulate with assistance only  - high risk of fall / injury - utilize all safety measures and fall precautions

## 2018-06-23 NOTE — SUBJECTIVE & OBJECTIVE
Past Medical History:   Diagnosis Date    Hypertension        History reviewed. No pertinent surgical history.    Review of patient's allergies indicates:  No Known Allergies    Current Facility-Administered Medications on File Prior to Encounter   Medication    [COMPLETED] 0.9%  NaCl infusion    [COMPLETED] sodium chloride 0.9% bolus 1,000 mL     No current outpatient prescriptions on file prior to encounter.     Family History     Problem Relation (Age of Onset)    Cancer Mother, Father    Diabetes Mother, Father    Hypertension Mother, Father    Kidney disease Father        Social History Main Topics    Smoking status: Current Every Day Smoker    Smokeless tobacco: Not on file    Alcohol use No    Drug use: No    Sexual activity: Not Currently     Review of Systems   Constitutional: Positive for fatigue. Negative for chills and fever.   HENT: Negative for congestion and sore throat.    Eyes: Negative for discharge and visual disturbance.   Respiratory: Positive for shortness of breath. Negative for cough and chest tightness.    Cardiovascular: Negative for chest pain, palpitations and leg swelling.   Gastrointestinal: Negative for abdominal distention, abdominal pain, diarrhea, nausea and vomiting.   Genitourinary: Positive for decreased urine volume. Negative for dysuria.        Reports dark concentrated urine    Musculoskeletal: Negative for back pain and joint swelling.   Skin: Negative.    Neurological: Positive for dizziness and light-headedness. Negative for seizures and syncope.   Psychiatric/Behavioral: Positive for confusion and decreased concentration.     Objective:     Vital Signs (Most Recent):  Temp: 97.5 °F (36.4 °C) (06/22/18 1902)  Pulse: 78 (06/22/18 1908)  Resp: 18 (06/22/18 1850)  BP: 136/80 (06/22/18 1903)  SpO2: 100 % (06/22/18 1908) Vital Signs (24h Range):  Temp:  [97.5 °F (36.4 °C)-98.4 °F (36.9 °C)] 97.5 °F (36.4 °C)  Pulse:  [] 78  Resp:  [0-20] 18  SpO2:  [97 %-100 %] 100  %  BP: (100-136)/(68-80) 136/80     Weight: 78.5 kg (173 lb)  Body mass index is 23.46 kg/m².    Physical Exam   Constitutional: He is oriented to person, place, and time. He appears well-developed and well-nourished. No distress.   HENT:   Head: Normocephalic and atraumatic.   Eyes: EOM are normal. Pupils are equal, round, and reactive to light.   Neck: Normal range of motion. Neck supple.   Cardiovascular: Normal rate, regular rhythm, normal heart sounds and intact distal pulses.    No murmur heard.  Pulmonary/Chest: Effort normal and breath sounds normal. No respiratory distress. He has no wheezes. He has no rales.   Abdominal: Soft. Bowel sounds are normal. He exhibits no distension. There is no tenderness.   Musculoskeletal: Normal range of motion. He exhibits no edema.   Neurological: He is alert and oriented to person, place, and time.   Skin: Skin is warm and dry. He is not diaphoretic.   Psychiatric: He has a normal mood and affect. His behavior is normal.   Nursing note and vitals reviewed.        CRANIAL NERVES     CN III, IV, VI   Pupils are equal, round, and reactive to light.  Extraocular motions are normal.        Significant Labs:   CBC:   Recent Labs  Lab 06/22/18  1500   WBC 11.30   HGB 12.2*   HCT 34.5*        CMP:   Recent Labs  Lab 06/22/18  1500 06/22/18 1953   * 136   K 3.4* 3.8   CL 93* 99   CO2 21* 19*    105   BUN 70* 62*   CREATININE 9.2* 6.6*   CALCIUM 10.0 9.1   PROT 8.6*  --    ALBUMIN 4.5  --    BILITOT 0.7  --    ALKPHOS 54*  --    AST 19  --    ALT 22  --    ANIONGAP 19* 18*   EGFRNONAA 5.7* 9*     Cardiac Markers:   Recent Labs  Lab 06/22/18  1500   BNP 12   CPK    346    Coagulation:   Recent Labs  Lab 06/22/18  1500   INR 1.1     TSH:   Recent Labs  Lab 06/22/18  1500   TSH 0.688     Urine Studies:   Recent Labs  Lab 06/22/18  1508   COLORU Yellow   APPEARANCEUA Clear   PHUR 5.0   SPECGRAV >=1.030*   PROTEINUA 2+*   GLUCUA Negative   KETONESU 1+*   BILIRUBINUA  1+*   OCCULTUA Negative   NITRITE Negative   UROBILINOGEN Negative   LEUKOCYTESUR Negative   RBCUA 2   WBCUA 2   BACTERIA Occasional   SQUAMEPITHEL 3   HYALINECASTS 0       Significant Imaging: I have reviewed all pertinent imaging results/findings within the past 24 hours.

## 2018-06-23 NOTE — ASSESSMENT & PLAN NOTE
patient with episode of syncope likely related to orthostatic hypotension secondary to volume loss.  Will dose adjust blood pressure medications according to patient's volume status and blood pressure at discharge. Continue to hold BP meds for now.

## 2018-06-23 NOTE — ASSESSMENT & PLAN NOTE
Likely secondary to pre renal azotemia secondary to dehydration.  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  He is responding well to hydration I do not believe he has any other causes.  I have stopped the q6 CPK and BMP so will just check comprehensive metabolic profile in the morning.  Electrolytes remained stable.  Nephrology has been consulted.  Will defer to them for further evaluation and management.  The patient appears to be doing well, will likely be able to discharge him home in the morning.

## 2018-06-23 NOTE — ASSESSMENT & PLAN NOTE
Likely related to orthostatic hypotension related to renal failure with decreased clearance of antihypertensive medication.  Patient is no longer orthostatic after appropriate hydration.  Will continue to monitor.  Echocardiogram pending.  No signs or symptoms of cardiogenic syncope.

## 2018-06-23 NOTE — PLAN OF CARE
Problem: Patient Care Overview  Goal: Plan of Care Review  Outcome: Ongoing (interventions implemented as appropriate)  Patient aao x 4. Denies pain. Plan of care reviewed with patient, verbalized understanding. IV fluids maintained. Patient ambulates independently. Remained free from fall and injury. Bed in lowest position and call light within reach. Vitals stable. Will continue to monitor.

## 2018-06-23 NOTE — ASSESSMENT & PLAN NOTE
patient with history of diastolic heart failure.  Echocardiogram is done and pending currently.  Will follow up with echo results and adjust medications accordingly.  Will likely hold ACE-inhibitor if the patient has had recent a KI.

## 2018-06-23 NOTE — PROGRESS NOTES
06/23/18 0740   Patient Assessment/Suction   Level of Consciousness (AVPU) alert   PRE-TX-O2-ETCO2   O2 Device (Oxygen Therapy) room air   SpO2 100 %   Pulse Oximetry Type Intermittent   $ Pulse Oximetry - Multiple Charge Pulse Oximetry - Multiple   Aerosol Therapy   $ Aerosol Therapy Charges PRN treatment not required   Respiratory Treatment Status PRN treatment not required

## 2018-06-23 NOTE — PLAN OF CARE
Problem: Patient Care Overview  Goal: Plan of Care Review  Outcome: Revised  Pt is AAOx4.  IVF infusing, no redness or swelling at site.  Cardiac monitoring in place.  VSS, in NAD, pt remains afebrile.  Pt remains free from injury.  Bed in low position, wheels locked, call light within reach.  Pt verbalized understanding of POC.  Will continue to monitor.

## 2018-06-23 NOTE — SUBJECTIVE & OBJECTIVE
Interval History:  Patient seen and examined.  No acute events overnight.  Patient says that he feels much better and has no further episodes of hypotension noted. Creatinine improving markedly with hydration therapy.    Review of Systems   Constitutional: Negative for chills, fatigue and fever.   Respiratory: Negative for cough and shortness of breath.    Cardiovascular: Negative for chest pain and leg swelling.   Gastrointestinal: Negative for abdominal pain, nausea and vomiting.   Musculoskeletal: Negative for back pain.   Neurological: Negative for weakness.   Psychiatric/Behavioral: Negative for confusion. The patient is not nervous/anxious.    All other systems reviewed and are negative.    Objective:     Vital Signs (Most Recent):  Temp: 97.6 °F (36.4 °C) (06/23/18 1130)  Pulse: 79 (06/23/18 1130)  Resp: 20 (06/23/18 1130)  BP: 117/77 (06/23/18 1130)  SpO2: 97 % (06/23/18 1130) Vital Signs (24h Range):  Temp:  [97.5 °F (36.4 °C)-98 °F (36.7 °C)] 97.6 °F (36.4 °C)  Pulse:  [72-98] 79  Resp:  [0-20] 20  SpO2:  [94 %-100 %] 97 %  BP: ()/(53-83) 117/77     Weight: 72.1 kg (159 lb)  Body mass index is 21.56 kg/m².    Intake/Output Summary (Last 24 hours) at 06/23/18 1506  Last data filed at 06/23/18 0600   Gross per 24 hour   Intake           1497.5 ml   Output                0 ml   Net           1497.5 ml      Physical Exam   Constitutional: He appears well-developed and well-nourished.   Eyes: EOM are normal. Pupils are equal, round, and reactive to light.   Neck: No JVD present.   Cardiovascular: Normal rate, regular rhythm, normal heart sounds and intact distal pulses.    Pulmonary/Chest: Effort normal and breath sounds normal.   Abdominal: Soft. Bowel sounds are normal. He exhibits no distension. There is no tenderness.   Musculoskeletal: He exhibits no edema or deformity.   Lymphadenopathy:     He has no cervical adenopathy.   Skin: No rash noted. No pallor.   Nursing note and vitals  reviewed.      Significant Labs: All pertinent labs within the past 24 hours have been reviewed.    Significant Imaging: I have reviewed all pertinent imaging results/findings within the past 24 hours.

## 2018-06-24 VITALS
WEIGHT: 159 LBS | DIASTOLIC BLOOD PRESSURE: 80 MMHG | TEMPERATURE: 98 F | SYSTOLIC BLOOD PRESSURE: 130 MMHG | RESPIRATION RATE: 18 BRPM | HEART RATE: 66 BPM | BODY MASS INDEX: 21.54 KG/M2 | HEIGHT: 72 IN | OXYGEN SATURATION: 99 %

## 2018-06-24 LAB
ALBUMIN SERPL BCP-MCNC: 3 G/DL
ALP SERPL-CCNC: 47 U/L
ALT SERPL W/O P-5'-P-CCNC: 14 U/L
ANION GAP SERPL CALC-SCNC: 8 MMOL/L
AST SERPL-CCNC: 11 U/L
BASOPHILS # BLD AUTO: 0 K/UL
BASOPHILS NFR BLD: 0.6 %
BILIRUB SERPL-MCNC: 0.2 MG/DL
BUN SERPL-MCNC: 31 MG/DL
CALCIUM SERPL-MCNC: 8.5 MG/DL
CHLORIDE SERPL-SCNC: 110 MMOL/L
CO2 SERPL-SCNC: 22 MMOL/L
CREAT SERPL-MCNC: 1 MG/DL
DIFFERENTIAL METHOD: ABNORMAL
EOSINOPHIL # BLD AUTO: 0.3 K/UL
EOSINOPHIL NFR BLD: 6 %
ERYTHROCYTE [DISTWIDTH] IN BLOOD BY AUTOMATED COUNT: 13.4 %
EST. GFR  (AFRICAN AMERICAN): >60 ML/MIN/1.73 M^2
EST. GFR  (NON AFRICAN AMERICAN): >60 ML/MIN/1.73 M^2
GLUCOSE SERPL-MCNC: 135 MG/DL
HCT VFR BLD AUTO: 29 %
HGB BLD-MCNC: 9.7 G/DL
LYMPHOCYTES # BLD AUTO: 2 K/UL
LYMPHOCYTES NFR BLD: 36.5 %
MAGNESIUM SERPL-MCNC: 2 MG/DL
MCH RBC QN AUTO: 28.7 PG
MCHC RBC AUTO-ENTMCNC: 33.5 G/DL
MCV RBC AUTO: 86 FL
MONOCYTES # BLD AUTO: 0.8 K/UL
MONOCYTES NFR BLD: 14.1 %
NEUTROPHILS # BLD AUTO: 2.3 K/UL
NEUTROPHILS NFR BLD: 42.8 %
PHOSPHATE SERPL-MCNC: 1.9 MG/DL
PLATELET # BLD AUTO: 210 K/UL
PLATELET BLD QL SMEAR: ABNORMAL
PMV BLD AUTO: 8.7 FL
POTASSIUM SERPL-SCNC: 3.6 MMOL/L
PROT SERPL-MCNC: 5.9 G/DL
RBC # BLD AUTO: 3.39 M/UL
SODIUM SERPL-SCNC: 140 MMOL/L
WBC # BLD AUTO: 5.4 K/UL

## 2018-06-24 PROCEDURE — 80053 COMPREHEN METABOLIC PANEL: CPT

## 2018-06-24 PROCEDURE — 85025 COMPLETE CBC W/AUTO DIFF WBC: CPT

## 2018-06-24 PROCEDURE — 94761 N-INVAS EAR/PLS OXIMETRY MLT: CPT

## 2018-06-24 PROCEDURE — 36415 COLL VENOUS BLD VENIPUNCTURE: CPT

## 2018-06-24 PROCEDURE — 83735 ASSAY OF MAGNESIUM: CPT

## 2018-06-24 PROCEDURE — 84100 ASSAY OF PHOSPHORUS: CPT

## 2018-06-24 PROCEDURE — 25000003 PHARM REV CODE 250: Performed by: NURSE PRACTITIONER

## 2018-06-24 PROCEDURE — 99900035 HC TECH TIME PER 15 MIN (STAT)

## 2018-06-24 RX ORDER — SODIUM,POTASSIUM PHOSPHATES 280-250MG
1 POWDER IN PACKET (EA) ORAL ONCE
Status: COMPLETED | OUTPATIENT
Start: 2018-06-24 | End: 2018-06-24

## 2018-06-24 RX ADMIN — POTASSIUM & SODIUM PHOSPHATES POWDER PACK 280-160-250 MG 1 PACKET: 280-160-250 PACK at 11:06

## 2018-06-24 NOTE — HOSPITAL COURSE
Patient is a 57-year-old male with a past medical history significant for hypertension who presents to hospital with acute kidney injury likely related to dehydration.  Patient was started on IV fluids and acute kidney injury resolved.  His antihypertensives were held secondary to hypotension and orthostasis with syncope.  Patient's blood pressure was within normal range at time of discharge and patient was asymptomatic.  He was discharged home and told to hold his blood pressure medications until seen in clinic.  He was also told to drink plenty of water and to watch his urine output throughout the day as he works in a job which is highly susceptible to dehydration.  Patient was seen on day of discharge and deemed medically stable for discharge.

## 2018-06-24 NOTE — DISCHARGE INSTRUCTIONS
Thank you for choosing [facility] for your medical care. The primary doctor who is taking care of you at the time of your discharge is Duncan Troy MD.     You were admitted to the hospital with TIN (acute kidney injury).     Please note your discharge instructions, including diet/activity restrictions, follow-up appointments, and medication changes.  If you have any questions about your medical issues, prescriptions, or any other questions, please feel free to contact the [facility] Medicine Dept at [phone] and we will help.    If you are previously with Home health, outpatient PT/OT or under a therapy program, you are cleared to return to those programs.    Please direct all long term medication refills and follow up to your primary care provider, Primary Doctor No. Thank you again for letting us take care of your health care needs.

## 2018-06-24 NOTE — NURSING
Pt ambulated to private car to meet with sister, he denies pain or sob, iv was removed intact, he is tolerating fluids and solid food.   Discharge instructions were given to him and discussed in detail regarding blood pressure medication, he verbalized understanding.  Tele monitor removed and returned to tech [id number]

## 2018-06-24 NOTE — PLAN OF CARE
06/24/18 1207   Final Note   Assessment Type Final Discharge Note   Discharge Disposition Home

## 2018-06-24 NOTE — PROGRESS NOTES
06/24/18 0730   Patient Assessment/Suction   Level of Consciousness (AVPU) alert   PRE-TX-O2-ETCO2   O2 Device (Oxygen Therapy) room air   SpO2 100 %   Pulse Oximetry Type Intermittent   $ Pulse Oximetry - Multiple Charge Pulse Oximetry - Multiple   Aerosol Therapy   $ Aerosol Therapy Charges PRN treatment not required   Respiratory Treatment Status PRN treatment not required

## 2018-06-24 NOTE — PLAN OF CARE
Problem: Patient Care Overview  Goal: Plan of Care Review  Outcome: Ongoing (interventions implemented as appropriate)  Patient AAO, VSS. IVF infusing as ordered. No complaints of pain, N/V, or dizziness.  Independent in ADLs.  Tele monitored; sinus rhythm. Pt verbalized understanding of POC.  Purposeful rounding done during shift to promote patient safety. Patient free from falls and injury during shift.  Non skid socks on when OOB.  Bed in lowest position, brakes locked, and call light within reach.  Will continue to monitor.

## 2018-06-24 NOTE — CONSULTS
"Consult Note  Nephrology    Consult Requested By: Duncan Troy MD     Reason for Consult: TIN    Chief Complaint   Patient presents with    Loss of Consciousness     C/o syncopal episodes x 1 week. Transferred here from [facility] for Nephrology.        SUBJECTIVE:     History of Present Illness:   55 y/o male patient transferred from [facility] [place] for TIN.  Initial Scr>9.  He reports he has been having dizziness, "black out spells", and nausea for about 10 days.  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  States he doesn't drink fluid "like I should", also, takes multiple ibuprofen daily for headaches.  Denies vomiting and diarrhea.  He denies any exac/reliev factors. We have been consulted for TIN with metabolic derangements.     6/24  Renal function much improved.  Pt feels better, no chest pain, SOB, abd pain, or edema.  K+ 3.6, phos 1.9.  Ordered one time dose phos nak. BP is improved. UOP 1.2L.     Assessment/plan:    TIN  NSAID exposure  Hypotension/Volume depletion  Hypokalemia, hypophosphatemia  Anemia    - Suspect prerenal TIN 2/2 hypotension/volume depletion in setting of poor oral intake + ACE/diuretic + NSAIDs. Renal function has rapidly improved with volume repletion. No NSAIDs.   - BP is improved. Keep ACE/diuretic on hold.  - He got oral KCl and phos repletion today.  - Hb drop may be dilutional- will trend.     Will continue to follow.     Past Medical History:   Diagnosis Date    Hypertension      History reviewed. No pertinent surgical history.  Family History   Problem Relation Age of Onset    Cancer Mother     Diabetes Mother     Hypertension Mother     Cancer Father     Diabetes Father     Hypertension Father     Kidney disease Father      Social History   Substance Use Topics    Smoking status: Current Every Day Smoker     Packs/day: 0.50     Years: 36.00    Smokeless tobacco: Never Used    Alcohol use No       Review of patient's allergies indicates:  No " Known Allergies     Review of Systems:  General ROS: negative for - fever or night sweats  Psychological ROS: negative for - behavioral disorder or depression  ENT ROS: negative for - headaches or visual changes  Hematological and Lymphatic ROS: negative for - bleeding problems or bruising  Endocrine ROS: negative for - temperature intolerance or unexpected weight changes  Respiratory ROS: no cough, shortness of breath, or wheezing  Cardiovascular ROS: no chest pain or dyspnea on exertion  Gastrointestinal ROS: no abdominal pain, change in bowel habits, or black or bloody stools  Genito-Urinary ROS: no dysuria  Musculoskeletal ROS: negative for - joint pain or joint swelling  Neurological ROS: no TIA or stroke symptoms  Dermatological ROS: negative for rash and skin lesion changes    OBJECTIVE:     Vital Signs Range (Last 24H):  Temp:  [96.3 °F (35.7 °C)-98.1 °F (36.7 °C)]   Pulse:  [65-83]   Resp:  [18-20]   BP: (111-127)/(58-82)   SpO2:  [97 %-100 %]     Physical Exam:  General- Patient alert and oriented x3 in NAD  HEENT- WNL  Neck- supple  CV- Regular rate and rhythm, No Murmur/kiko/rubs  Resp- Lungs CTA Bilaterally, No increased WOB  GI- Non tender/non-distended, BS normoactive x4 quads  Extrem- No cyanosis, clubbing, edema. Pulses 2+ and symmetric  Derm- No rashes, masses, or lesions noted  Neuro-  No flap. No focal deficits noted.     Body mass index is 21.56 kg/m².    Laboratory:  CBC:     Recent Labs  Lab 06/24/18  0449   WBC 5.40   RBC 3.39*   HGB 9.7*   HCT 29.0*      MCV 86   MCH 28.7   MCHC 33.5     CMP:     Recent Labs  Lab 06/24/18  0449   *   CALCIUM 8.5*   ALBUMIN 3.0*   PROT 5.9*      K 3.6   CO2 22*      BUN 31*   CREATININE 1.0   ALKPHOS 47*   ALT 14   AST 11   BILITOT 0.2       Recent Labs  Lab 06/22/18  1905   COLORU Yellow   SPECGRAV 1.020   PHUR 6.0   PROTEINUA Trace*   BACTERIA None   NITRITE Negative   LEUKOCYTESUR Negative   UROBILINOGEN Negative   HYALINECASTS  35*       Diagnostic Results:  Labs: Reviewed   Imaging: Reviewed    Emely Good MD MPH

## 2018-06-25 NOTE — DISCHARGE SUMMARY
[facility] Medicine  Discharge Summary      Patient Name: Alphonso Mathur  MRN: [id number]  Admission Date: 6/22/2018  Hospital Length of Stay: 2 days  Discharge Date and Time: 6/24/2018  3:30 PM  Attending Physician: Connie att. providers found   Discharging Provider: Duncan Troy MD  Primary Care Provider: Primary Doctor Connie      HPI:   56 y/o gentleman, who was transferred from [facility] to [facility] d/t TIN and syncope.  He has a PMH of HTN.  He reports over the past week he has been feeling fatigued and lightheaded.  He endorses dizziness and near syncope with position changes over the same period of time.  Additionally, over the past two days he has had two witnessed syncopal events.  He denies recent illness, fever, chills, CP, N/V/D.  He endorses SOB with activity, fatigue, decreased urine production, and dark concentrated urine.  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  He states that he consumes 1-2 poweraides daily, but states that he does not drink much water.  Additionally, he reports increasing ibuprofen usage recently for aches and pains.  Initial workup reveals afebrile, no leukocytosis, Crt 9.2, BUN 70, , K 3.4.  He received 2L fluid challenge while at [place] and one additional liter of LR while in the ER.  Repeat labs Crt 6.6, BUN 62.     * No surgery found *      Hospital Course:   Patient is a 57-year-old male with a past medical history significant for hypertension who presents to hospital with acute kidney injury likely related to dehydration.  Patient was started on IV fluids and acute kidney injury resolved.  His antihypertensives were held secondary to hypotension and orthostasis with syncope.  Patient's blood pressure was within normal range at time of discharge and patient was asymptomatic.  He was discharged home and told to hold his blood pressure medications until seen in clinic.  He was also told to  drink plenty of water and to watch his urine output throughout the day as he works in a job which is highly susceptible to dehydration.  Patient was seen on day of discharge and deemed medically stable for discharge.     Consults:   Consults         Status Ordering Provider     Inpatient consult to Nephrology  Once     Provider:  Emely Good MD    Completed YUSEF [facility]          No new Assessment & Plan notes have been filed under this hospital service since the last note was generated.  Service: Hospital Medicine    Final Active Diagnoses:    Diagnosis Date Noted POA    PRINCIPAL PROBLEM:  TIN (acute kidney injury) [N17.9] 06/22/2018 Yes    Syncope [R55] 06/22/2018 Yes    Diastolic heart failure [I50.30] 06/23/2018 Yes    Essential hypertension [I10] 06/22/2018 Yes    Anemia [D64.9] 06/22/2018 Yes      Problems Resolved During this Admission:    Diagnosis Date Noted Date Resolved POA       Discharged Condition: stable    Disposition: Home or Self Care    Follow Up:  Follow-up Information     PCP.               Patient Instructions:     Diet Adult Regular     Activity as tolerated     Notify your health care provider if you experience any of the following:  temperature >100.4     Notify your health care provider if you experience any of the following:  difficulty breathing or increased cough     Notify your health care provider if you experience any of the following:  severe uncontrolled pain         Significant Diagnostic Studies:     Pending Diagnostic Studies:     None         Medications:  Reconciled Home Medications:      Medication List      STOP taking these medications    amLODIPine 5 MG tablet  Commonly known as:  NORVASC     lisinopril-hydrochlorothiazide 10-12.5 mg per tablet  Commonly known as:  PRINZIDE,ZESTORETIC     metoprolol tartrate 50 MG tablet  Commonly known as:  LOPRESSOR            Indwelling Lines/Drains at time of discharge:   Lines/Drains/Airways          No matching active  lines, drains, or airways          Time spent on the discharge of patient: 35 minutes  Patient was seen and examined on the date of discharge and determined to be suitable for discharge.         Duncan Troy MD  Department of Hospital Medicine  [facility]

## 2018-07-23 ENCOUNTER — HOSPITAL ENCOUNTER (EMERGENCY)
Facility: HOSPITAL | Age: 58
Discharge: HOME | End: 2018-07-23
Attending: EMERGENCY MEDICINE

## 2018-07-23 ENCOUNTER — OFFICE VISIT (OUTPATIENT)
Dept: SURGERY | Facility: CLINIC | Age: 58
End: 2018-07-23
Attending: EMERGENCY MEDICINE

## 2018-07-23 VITALS
OXYGEN SATURATION: 100 % | DIASTOLIC BLOOD PRESSURE: 84 MMHG | HEART RATE: 77 BPM | SYSTOLIC BLOOD PRESSURE: 117 MMHG | BODY MASS INDEX: 22.35 KG/M2 | RESPIRATION RATE: 18 BRPM | TEMPERATURE: 98 F | WEIGHT: 165 LBS | HEIGHT: 72 IN

## 2018-07-23 VITALS
SYSTOLIC BLOOD PRESSURE: 150 MMHG | HEART RATE: 89 BPM | TEMPERATURE: 96 F | HEIGHT: 72 IN | BODY MASS INDEX: 21.4 KG/M2 | DIASTOLIC BLOOD PRESSURE: 92 MMHG | OXYGEN SATURATION: 94 % | WEIGHT: 158 LBS

## 2018-07-23 DIAGNOSIS — R10.11 RIGHT UPPER QUADRANT PAIN: Primary | ICD-10-CM

## 2018-07-23 DIAGNOSIS — R52 PAIN: ICD-10-CM

## 2018-07-23 DIAGNOSIS — K81.0 CHOLECYSTITIS, ACUTE: Primary | ICD-10-CM

## 2018-07-23 LAB
ALBUMIN SERPL BCP-MCNC: 3.9 G/DL
ALP SERPL-CCNC: 47 U/L
ALT SERPL W/O P-5'-P-CCNC: 64 U/L
ANION GAP SERPL CALC-SCNC: 4 MMOL/L
AST SERPL-CCNC: 115 U/L
BASOPHILS # BLD AUTO: 0.06 K/UL
BASOPHILS NFR BLD: 0.7 %
BILIRUB SERPL-MCNC: 0.7 MG/DL
BUN SERPL-MCNC: 12 MG/DL
CALCIUM SERPL-MCNC: 9 MG/DL
CHLORIDE SERPL-SCNC: 103 MMOL/L
CO2 SERPL-SCNC: 28 MMOL/L
CREAT SERPL-MCNC: 1.1 MG/DL
DIFFERENTIAL METHOD: ABNORMAL
EOSINOPHIL # BLD AUTO: 0.3 K/UL
EOSINOPHIL NFR BLD: 3.8 %
ERYTHROCYTE [DISTWIDTH] IN BLOOD BY AUTOMATED COUNT: 13.4 %
EST. GFR  (AFRICAN AMERICAN): >60 ML/MIN/1.73 M^2
EST. GFR  (NON AFRICAN AMERICAN): >60 ML/MIN/1.73 M^2
GLUCOSE SERPL-MCNC: 185 MG/DL
HCT VFR BLD AUTO: 33 %
HGB BLD-MCNC: 11.2 G/DL
IMM GRANULOCYTES # BLD AUTO: 0.01 K/UL
IMM GRANULOCYTES NFR BLD AUTO: 0.1 %
LYMPHOCYTES # BLD AUTO: 1.8 K/UL
LYMPHOCYTES NFR BLD: 21 %
MCH RBC QN AUTO: 28.9 PG
MCHC RBC AUTO-ENTMCNC: 33.9 G/DL
MCV RBC AUTO: 85 FL
MONOCYTES # BLD AUTO: 0.6 K/UL
MONOCYTES NFR BLD: 7.3 %
NEUTROPHILS # BLD AUTO: 5.8 K/UL
NEUTROPHILS NFR BLD: 67.1 %
NRBC BLD-RTO: 0 /100 WBC
PLATELET # BLD AUTO: 208 K/UL
PMV BLD AUTO: 10.5 FL
POTASSIUM SERPL-SCNC: 3.2 MMOL/L
PROT SERPL-MCNC: 7.1 G/DL
RBC # BLD AUTO: 3.87 M/UL
SODIUM SERPL-SCNC: 135 MMOL/L
WBC # BLD AUTO: 8.68 K/UL

## 2018-07-23 PROCEDURE — 36415 COLL VENOUS BLD VENIPUNCTURE: CPT

## 2018-07-23 PROCEDURE — 63600175 PHARM REV CODE 636 W HCPCS: Mod: JG | Performed by: EMERGENCY MEDICINE

## 2018-07-23 PROCEDURE — 76705 ECHO EXAM OF ABDOMEN: CPT | Mod: 26,,, | Performed by: RADIOLOGY

## 2018-07-23 PROCEDURE — 99203 OFFICE O/P NEW LOW 30 MIN: CPT | Mod: S$GLB,,, | Performed by: SURGERY

## 2018-07-23 PROCEDURE — 80053 COMPREHEN METABOLIC PANEL: CPT

## 2018-07-23 PROCEDURE — 96372 THER/PROPH/DIAG INJ SC/IM: CPT

## 2018-07-23 PROCEDURE — 99284 EMERGENCY DEPT VISIT MOD MDM: CPT | Mod: 25

## 2018-07-23 PROCEDURE — 76705 ECHO EXAM OF ABDOMEN: CPT | Mod: TC

## 2018-07-23 PROCEDURE — 85025 COMPLETE CBC W/AUTO DIFF WBC: CPT

## 2018-07-23 RX ORDER — GLUCAGON 1 MG
1 KIT INJECTION
Status: COMPLETED | OUTPATIENT
Start: 2018-07-23 | End: 2018-07-23

## 2018-07-23 RX ADMIN — GLUCAGON 1 MG: KIT at 08:07

## 2018-07-23 NOTE — ED TRIAGE NOTES
Pt to ed with c/o upper abd pain that began this am, reports last bm was yesterday evening, denies vomiting

## 2018-07-23 NOTE — PROGRESS NOTES
Subjective:       Patient ID: Alphonso Mathur is a 57 y.o. male.    Chief Complaint: Follow-up (ER) and Gall Bladder Problem      HPI:  Mr. Mathur presents today as a today referral from the emergency room. He was seen in the ER this morning with a sudden onset of severe right upper quadrant sharp stabbing nonradiating abdominal pain. Pain began approximately 7:30 this morning.  He ate fried chicken for dinner last night.  Pain was associated with nausea but no vomiting.  No fevers, chills, jaundice, biliuria, acholia, diarrhea, constipation, melena, hematochezia, hematemesis, anorexia, weight loss, etc.  No previous episodes of similar pain. No triggering issues.  No aggravating factors.  No alleviating factors.  No other associated symptoms.  Evaluation in the emergency room included laboratory studies and a gallbladder ultrasound.        Allergies & Meds:  Review of patient's allergies indicates:  No Known Allergies    No current outpatient prescriptions on file.     No current facility-administered medications for this visit.        PMFSHx:  Past Medical History:   Diagnosis Date    Hypertension        History reviewed. No pertinent surgical history.    Family History   Problem Relation Age of Onset    Cancer Mother     Diabetes Mother     Hypertension Mother     Cancer Father     Diabetes Father     Hypertension Father     Kidney disease Father        Social History   Substance Use Topics    Smoking status: Current Every Day Smoker     Packs/day: 0.50     Years: 36.00    Smokeless tobacco: Never Used    Alcohol use No       Review of Systems   Constitutional: Negative for appetite change, chills, fatigue, fever and unexpected weight change.   HENT: Negative for congestion, dental problem, ear pain, mouth sores, postnasal drip, rhinorrhea, sore throat, tinnitus, trouble swallowing and voice change.    Eyes: Negative for photophobia, pain, discharge and visual disturbance.   Respiratory: Negative for  cough, chest tightness, shortness of breath and wheezing.    Cardiovascular: Negative for chest pain, palpitations and leg swelling.   Gastrointestinal: Negative for abdominal pain, blood in stool, constipation, diarrhea, nausea and vomiting.   Endocrine: Negative for cold intolerance, heat intolerance, polydipsia, polyphagia and polyuria.   Genitourinary: Negative for difficulty urinating, dysuria, flank pain, frequency, hematuria and urgency.   Musculoskeletal: Negative for arthralgias, joint swelling and myalgias.   Skin: Negative for color change and rash.   Allergic/Immunologic: Negative for immunocompromised state.   Neurological: Negative for dizziness, tremors, seizures, syncope, speech difficulty, weakness, numbness and headaches.   Hematological: Negative for adenopathy. Does not bruise/bleed easily.   Psychiatric/Behavioral: Negative for agitation, confusion, hallucinations, self-injury and suicidal ideas. The patient is not nervous/anxious.        Objective:      Physical Exam   Constitutional: He is oriented to person, place, and time. He appears well-developed and well-nourished. He is active.  Non-toxic appearance. He does not appear ill. No distress.   HENT:   Head: Normocephalic and atraumatic. Head is without contusion.   Right Ear: Hearing and external ear normal. No drainage or tenderness.   Left Ear: Hearing and external ear normal. No drainage or tenderness.   Nose: Nose normal. No rhinorrhea.   Eyes: Conjunctivae and lids are normal. Pupils are equal, round, and reactive to light. Right eye exhibits no discharge and no exudate. Left eye exhibits no discharge and no exudate. Right conjunctiva is not injected. Left conjunctiva is not injected.   Neck: Trachea normal, normal range of motion and phonation normal. No JVD present. Carotid bruit is not present. No tracheal deviation and no edema present. No thyroid mass and no thyromegaly present.   Cardiovascular: Normal rate, regular rhythm, S1  normal, S2 normal, normal heart sounds and intact distal pulses.  PMI is not displaced.  Exam reveals no gallop and no friction rub.    No murmur heard.  Pulmonary/Chest: Effort normal and breath sounds normal. No accessory muscle usage. No tachypnea. No respiratory distress. He exhibits no mass, no tenderness, no crepitus and no retraction. Right breast exhibits no inverted nipple, no mass, no nipple discharge and no skin change. Left breast exhibits no inverted nipple, no mass, no nipple discharge and no skin change. Breasts are symmetrical.   Abdominal: Soft. Normal appearance and bowel sounds are normal. He exhibits no distension, no fluid wave, no abdominal bruit and no mass. There is no hepatosplenomegaly. There is no tenderness. No hernia. Hernia confirmed negative in the ventral area, confirmed negative in the right inguinal area and confirmed negative in the left inguinal area.   Genitourinary: Testes normal and penis normal. Right testis shows no mass and no swelling. Left testis shows no mass and no swelling.   Musculoskeletal:        Cervical back: Normal.        Thoracic back: Normal.        Lumbar back: Normal.        Right upper arm: Normal.        Left upper arm: Normal.        Right forearm: Normal.        Left forearm: Normal.        Right hand: Normal.        Left hand: Normal.        Right upper leg: Normal.        Left upper leg: Normal.        Right lower leg: Normal.        Left lower leg: Normal.        Right foot: Normal.        Left foot: Normal.   Lymphadenopathy:        Head (right side): No submental and no submandibular adenopathy present.        Head (left side): No submental and no submandibular adenopathy present.     He has no cervical adenopathy.     He has no axillary adenopathy. No inguinal adenopathy noted on the right or left side.        Right: No inguinal and no supraclavicular adenopathy present.        Left: No inguinal and no supraclavicular adenopathy present.    Neurological: He is alert and oriented to person, place, and time. He has normal strength. He is not disoriented. He displays no atrophy and no tremor. No cranial nerve deficit or sensory deficit. Coordination and gait normal. GCS eye subscore is 4. GCS verbal subscore is 5. GCS motor subscore is 6.   Skin: Skin is warm, dry and intact. No lesion and no rash noted. No cyanosis. Nails show no clubbing.   Psychiatric: He has a normal mood and affect. His speech is normal and behavior is normal. Thought content normal. He is not actively hallucinating. He is attentive.         Test Results:  Gallbladder ultrasound films and report reviewed from 7/23/2018.  No evidence of cholelithiasis, cholecystitis, choledocholithiasis, or dilated bile ducts    Lab results reviewed from 7/23/2018.  CBC showed no evidence of leukocytosis or thrombocytopenia, mild normochromic normocytic anemia was present with a hemoglobin of 11.2 grams/deciliter.  This has improved in comparison with a CBC from 1 month ago that showed a hemoglobin of 9.7 grams/deciliter.  Electrolytes this morning showed a mild hyponatremia hypokalemia, otherwise all electrolytes were in the range of normal. BUN and creatinine showed no evidence of renal dysfunction.  Glucose was slightly elevated at 185 mg/dL.  Liver profile showed a mild transaminitis with an AST of 115, ALT 64; no evidence of biliary obstruction.    Assessment:       Right upper quadrant abdominal pain, single episode now resolved.  No evidence of cholelithiasis.  Differential diagnosis includes is not limited to esophagitis, hiatal hernia, gastritis, gastric ulcer, duodenitis, biliary disease, esophageal neoplasm, gastric neoplasm, etc. Options include but are not limited to endoscopy, 2nd opinion, laparoscopy, radiologic studies, observation, etc.    1. Right upper quadrant pain        Plan:   Right upper quadrant pain        Follow-up if symptoms recur.    Counseling/Medical Decision Making:   Mr. Mathur was counseled regarding the results of his evaluation thus far and the options available to him at this time.  Options discussed included were not limited to expectant management and observation with further investigation for any recurrent pain, proceed with HIDA scan now, proceed with EGD now, additional radiologic studies, etc.  He declines to proceed with further investigation at this time as his pain has completely resolved.  He will return to clinic immediately for any recurrent symptoms.

## 2018-07-23 NOTE — ED PROVIDER NOTES
Encounter Date: 7/23/2018       History     Chief Complaint   Patient presents with    Abdominal Pain     upper     This is a 57-year-old white male here with a sudden onset of epigastric right upper quadrant pain. He states he was sudden onset sharp and made him nauseated however he has not vomited.  He denies any trauma on admission is vital signs are normal otherwise other than elevated blood pressure.  Patient is in mild distress in the room.          Review of patient's allergies indicates:  No Known Allergies  Past Medical History:   Diagnosis Date    Hypertension      History reviewed. No pertinent surgical history.  Family History   Problem Relation Age of Onset    Cancer Mother     Diabetes Mother     Hypertension Mother     Cancer Father     Diabetes Father     Hypertension Father     Kidney disease Father      Social History   Substance Use Topics    Smoking status: Current Every Day Smoker     Packs/day: 0.50     Years: 36.00    Smokeless tobacco: Never Used    Alcohol use No     Review of Systems   Gastrointestinal: Positive for abdominal pain and nausea.   All other systems reviewed and are negative.      Physical Exam     Initial Vitals [07/23/18 0741]   BP Pulse Resp Temp SpO2   (!) 161/114 68 18 97.8 °F (36.6 °C) 98 %      MAP       --         Physical Exam    Nursing note and vitals reviewed.  Constitutional: He appears well-developed and well-nourished.   HENT:   Head: Normocephalic.   Right Ear: External ear normal.   Left Ear: External ear normal.   Nose: Nose normal.   Mouth/Throat: Oropharynx is clear and moist.   Eyes: Conjunctivae and EOM are normal. Pupils are equal, round, and reactive to light.   Neck: Normal range of motion. Neck supple.   Cardiovascular: Normal rate, regular rhythm, normal heart sounds and intact distal pulses.   No murmur heard.  Pulmonary/Chest: Breath sounds normal. He has no wheezes. He has no rhonchi. He has no rales.   Abdominal: Soft. Bowel sounds are  normal. He exhibits no distension, no pulsatile liver, no abdominal bruit, no ascites and no mass. There is no hepatosplenomegaly. There is tenderness in the right upper quadrant and epigastric area.   Musculoskeletal: Normal range of motion.   Neurological: He is alert and oriented to person, place, and time. He has normal strength and normal reflexes. He displays normal reflexes. No cranial nerve deficit.   Skin: Skin is warm and dry. Capillary refill takes less than 2 seconds.   Psychiatric: He has a normal mood and affect. His behavior is normal. Judgment and thought content normal.         ED Course   Procedures  Labs Reviewed   CBC W/ AUTO DIFFERENTIAL   COMPREHENSIVE METABOLIC PANEL          Imaging Results    None          Medical Decision Making:   ED Management:  Discussed this case with Dr. Suarez he will see him in the clinic this afternoon at 1600  Patient states he is pain-free at the moment                      Clinical Impression:   The primary encounter diagnosis was Cholecystitis, acute. A diagnosis of Pain was also pertinent to this visit.                             Pawel Lopes MD  07/23/18 1100

## 2018-07-23 NOTE — LETTER
July 23, 2018      Pawel Lopes MD  [address]  [place] [address]           [facility] General Surgery  [address] [place] [address]  Phone: [phone]  Fax: [phone]          Patient: Alphonso Mathur   MR Number: [id number]   YOB: 1960   Date of Visit: 7/23/2018       Dear Dr. Pawel Lopes:    Thank you for referring Alphonso Mathur to me for evaluation. Attached you will find relevant portions of my assessment and plan of care.    If you have questions, please do not hesitate to call me. I look forward to following Alphonso Mathur along with you.    Sincerely,    Lan Suarez MD    Enclosure  CC:  No Recipients    If you would like to receive this communication electronically, please contact [email or web address] or [phone] to request more information on [employer] access.    For providers and/or their staff who would like to refer a patient to [facility], please contact us through our one-stop-shop provider referral line, [facility] , at [phone].    If you feel you have received this communication in error or would no longer like to receive these types of communications, please e-mail [email or web address]

## 2018-09-05 ENCOUNTER — HOSPITAL ENCOUNTER (EMERGENCY)
Facility: HOSPITAL | Age: 58
Discharge: HOME | End: 2018-09-05
Attending: FAMILY MEDICINE

## 2018-09-05 VITALS
WEIGHT: 160 LBS | DIASTOLIC BLOOD PRESSURE: 99 MMHG | RESPIRATION RATE: 20 BRPM | HEIGHT: 72 IN | SYSTOLIC BLOOD PRESSURE: 155 MMHG | TEMPERATURE: 98 F | HEART RATE: 89 BPM | BODY MASS INDEX: 21.67 KG/M2 | OXYGEN SATURATION: 98 %

## 2018-09-05 DIAGNOSIS — T30.4 CHEMICAL BURN: Primary | ICD-10-CM

## 2018-09-05 PROCEDURE — 63600175 PHARM REV CODE 636 W HCPCS: Performed by: FAMILY MEDICINE

## 2018-09-05 PROCEDURE — 99283 EMERGENCY DEPT VISIT LOW MDM: CPT | Mod: 25

## 2018-09-05 PROCEDURE — 96372 THER/PROPH/DIAG INJ SC/IM: CPT

## 2018-09-05 RX ORDER — KETOROLAC TROMETHAMINE 30 MG/ML
60 INJECTION, SOLUTION INTRAMUSCULAR; INTRAVENOUS
Status: COMPLETED | OUTPATIENT
Start: 2018-09-05 | End: 2018-09-05

## 2018-09-05 RX ADMIN — KETOROLAC TROMETHAMINE 60 MG: 30 INJECTION, SOLUTION INTRAMUSCULAR at 08:09
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
## 2021-08-10 ENCOUNTER — HOSPITAL ENCOUNTER (EMERGENCY)
Facility: HOSPITAL | Age: 61
Discharge: ANOTHER HOSPITAL | End: 2021-08-11
Attending: FAMILY MEDICINE
Payer: OTHER GOVERNMENT

## 2021-08-10 DIAGNOSIS — K83.8 DILATED CBD, ACQUIRED: ICD-10-CM

## 2021-08-10 DIAGNOSIS — K80.42 CHOLEDOCHOLITHIASIS WITH ACUTE CHOLECYSTITIS: Primary | ICD-10-CM

## 2021-08-10 DIAGNOSIS — E87.6 HYPOKALEMIA: ICD-10-CM

## 2021-08-10 DIAGNOSIS — R74.01 ELEVATED TRANSAMINASE LEVEL: ICD-10-CM

## 2021-08-10 DIAGNOSIS — E83.42 HYPOMAGNESEMIA: ICD-10-CM

## 2021-08-10 DIAGNOSIS — R10.9 ABDOMINAL PAIN: ICD-10-CM

## 2021-08-10 PROCEDURE — 99285 EMERGENCY DEPT VISIT HI MDM: CPT | Mod: 25

## 2021-08-11 ENCOUNTER — HOSPITAL ENCOUNTER (INPATIENT)
Facility: HOSPITAL | Age: 61
LOS: 3 days | Discharge: HOME | DRG: 418 | End: 2021-08-14
Attending: INTERNAL MEDICINE | Admitting: INTERNAL MEDICINE
Payer: OTHER GOVERNMENT

## 2021-08-11 VITALS
DIASTOLIC BLOOD PRESSURE: 97 MMHG | BODY MASS INDEX: 18.65 KG/M2 | TEMPERATURE: 98 F | SYSTOLIC BLOOD PRESSURE: 161 MMHG | WEIGHT: 150 LBS | OXYGEN SATURATION: 95 % | HEIGHT: 75 IN | HEART RATE: 54 BPM | RESPIRATION RATE: 16 BRPM

## 2021-08-11 DIAGNOSIS — K80.42 CHOLEDOCHOLITHIASIS WITH ACUTE CHOLECYSTITIS: Primary | ICD-10-CM

## 2021-08-11 DIAGNOSIS — K83.09 CHOLANGITIS: ICD-10-CM

## 2021-08-11 DIAGNOSIS — K81.9 CHOLECYSTITIS: ICD-10-CM

## 2021-08-11 DIAGNOSIS — R07.9 CHEST PAIN: ICD-10-CM

## 2021-08-11 PROBLEM — E83.42 HYPOMAGNESEMIA: Status: ACTIVE | Noted: 2021-08-11

## 2021-08-11 PROBLEM — E83.51 HYPOCALCEMIA: Status: ACTIVE | Noted: 2021-08-11

## 2021-08-11 PROBLEM — F12.20 MARIJUANA DEPENDENCE: Status: ACTIVE | Noted: 2021-08-11

## 2021-08-11 PROBLEM — F17.200 TOBACCO DEPENDENCY: Status: ACTIVE | Noted: 2021-08-11

## 2021-08-11 PROBLEM — E44.0 PROTEIN-CALORIE MALNUTRITION, MODERATE: Status: ACTIVE | Noted: 2021-08-11

## 2021-08-11 PROBLEM — E87.6 HYPOKALEMIA: Status: ACTIVE | Noted: 2021-08-11

## 2021-08-11 LAB
ALBUMIN SERPL BCP-MCNC: 2.8 G/DL (ref 3.5–5.2)
ALBUMIN SERPL BCP-MCNC: 3.5 G/DL (ref 3.5–5.2)
ALP SERPL-CCNC: 129 U/L (ref 55–135)
ALP SERPL-CCNC: 72 U/L (ref 55–135)
ALT SERPL W/O P-5'-P-CCNC: 283 U/L (ref 10–44)
ALT SERPL W/O P-5'-P-CCNC: 610 U/L (ref 10–44)
AMMONIA PLAS-SCNC: 65 UMOL/L (ref 10–50)
ANION GAP SERPL CALC-SCNC: 10 MMOL/L (ref 8–16)
ANION GAP SERPL CALC-SCNC: 8 MMOL/L (ref 8–16)
AST SERPL-CCNC: 412 U/L (ref 10–40)
AST SERPL-CCNC: 482 U/L (ref 10–40)
BASOPHILS # BLD AUTO: 0.03 K/UL (ref 0–0.2)
BASOPHILS NFR BLD: 0.4 % (ref 0–1.9)
BILIRUB SERPL-MCNC: 1.1 MG/DL (ref 0.1–1)
BILIRUB SERPL-MCNC: 1.2 MG/DL (ref 0.1–1)
BUN SERPL-MCNC: 13 MG/DL (ref 6–20)
BUN SERPL-MCNC: 9 MG/DL (ref 6–20)
CA-I BLDV-SCNC: 1.18 MMOL/L (ref 1.06–1.42)
CALCIUM SERPL-MCNC: 7 MG/DL (ref 8.7–10.5)
CALCIUM SERPL-MCNC: 9.4 MG/DL (ref 8.7–10.5)
CHLORIDE SERPL-SCNC: 105 MMOL/L (ref 95–110)
CHLORIDE SERPL-SCNC: 113 MMOL/L (ref 95–110)
CO2 SERPL-SCNC: 23 MMOL/L (ref 23–29)
CO2 SERPL-SCNC: 25 MMOL/L (ref 23–29)
CREAT SERPL-MCNC: 0.8 MG/DL (ref 0.5–1.4)
CREAT SERPL-MCNC: 0.9 MG/DL (ref 0.5–1.4)
DIFFERENTIAL METHOD: ABNORMAL
EOSINOPHIL # BLD AUTO: 0.1 K/UL (ref 0–0.5)
EOSINOPHIL NFR BLD: 1.2 % (ref 0–8)
ERYTHROCYTE [DISTWIDTH] IN BLOOD BY AUTOMATED COUNT: 12.8 % (ref 11.5–14.5)
EST. GFR  (AFRICAN AMERICAN): >60 ML/MIN/1.73 M^2
EST. GFR  (AFRICAN AMERICAN): >60 ML/MIN/1.73 M^2
EST. GFR  (NON AFRICAN AMERICAN): >60 ML/MIN/1.73 M^2
EST. GFR  (NON AFRICAN AMERICAN): >60 ML/MIN/1.73 M^2
GLUCOSE SERPL-MCNC: 126 MG/DL (ref 70–110)
GLUCOSE SERPL-MCNC: 98 MG/DL (ref 70–110)
HAV IGM SERPL QL IA: NEGATIVE
HBV CORE IGM SERPL QL IA: NEGATIVE
HBV SURFACE AG SERPL QL IA: NEGATIVE
HCT VFR BLD AUTO: 36.3 % (ref 40–54)
HCV AB SERPL QL IA: NEGATIVE
HGB BLD-MCNC: 12 G/DL (ref 14–18)
IMM GRANULOCYTES # BLD AUTO: 0.02 K/UL (ref 0–0.04)
IMM GRANULOCYTES NFR BLD AUTO: 0.3 % (ref 0–0.5)
LACTATE SERPL-SCNC: 1.3 MMOL/L (ref 0.5–2.2)
LIPASE SERPL-CCNC: 35 U/L (ref 4–60)
LYMPHOCYTES # BLD AUTO: 1 K/UL (ref 1–4.8)
LYMPHOCYTES NFR BLD: 12.7 % (ref 18–48)
MAGNESIUM SERPL-MCNC: 1.4 MG/DL (ref 1.6–2.6)
MAGNESIUM SERPL-MCNC: 2.2 MG/DL (ref 1.6–2.6)
MCH RBC QN AUTO: 28 PG (ref 27–31)
MCHC RBC AUTO-ENTMCNC: 33.1 G/DL (ref 32–36)
MCV RBC AUTO: 85 FL (ref 82–98)
MONOCYTES # BLD AUTO: 0.7 K/UL (ref 0.3–1)
MONOCYTES NFR BLD: 8.8 % (ref 4–15)
NEUTROPHILS # BLD AUTO: 5.9 K/UL (ref 1.8–7.7)
NEUTROPHILS NFR BLD: 76.6 % (ref 38–73)
NRBC BLD-RTO: 0 /100 WBC
PLATELET # BLD AUTO: 224 K/UL (ref 150–450)
PMV BLD AUTO: 10.8 FL (ref 9.2–12.9)
POTASSIUM SERPL-SCNC: 2.8 MMOL/L (ref 3.5–5.1)
POTASSIUM SERPL-SCNC: 4.3 MMOL/L (ref 3.5–5.1)
PROT SERPL-MCNC: 5.3 G/DL (ref 6–8.4)
PROT SERPL-MCNC: 7.1 G/DL (ref 6–8.4)
PTH-INTACT SERPL-MCNC: 64.3 PG/ML (ref 9–77)
RBC # BLD AUTO: 4.28 M/UL (ref 4.6–6.2)
SARS-COV-2 RDRP RESP QL NAA+PROBE: NEGATIVE
SODIUM SERPL-SCNC: 140 MMOL/L (ref 136–145)
SODIUM SERPL-SCNC: 144 MMOL/L (ref 136–145)
TSH SERPL DL<=0.005 MIU/L-ACNC: 1.12 UIU/ML (ref 0.4–4)
WBC # BLD AUTO: 7.72 K/UL (ref 3.9–12.7)

## 2021-08-11 PROCEDURE — 82330 ASSAY OF CALCIUM: CPT | Performed by: NURSE PRACTITIONER

## 2021-08-11 PROCEDURE — 96361 HYDRATE IV INFUSION ADD-ON: CPT

## 2021-08-11 PROCEDURE — 82652 VIT D 1 25-DIHYDROXY: CPT | Performed by: NURSE PRACTITIONER

## 2021-08-11 PROCEDURE — 85025 COMPLETE CBC W/AUTO DIFF WBC: CPT | Performed by: FAMILY MEDICINE

## 2021-08-11 PROCEDURE — 83735 ASSAY OF MAGNESIUM: CPT | Mod: 91 | Performed by: NURSE PRACTITIONER

## 2021-08-11 PROCEDURE — 96367 TX/PROPH/DG ADDL SEQ IV INF: CPT

## 2021-08-11 PROCEDURE — 25000003 PHARM REV CODE 250: Performed by: NURSE PRACTITIONER

## 2021-08-11 PROCEDURE — 96360 HYDRATION IV INFUSION INIT: CPT

## 2021-08-11 PROCEDURE — 96375 TX/PRO/DX INJ NEW DRUG ADDON: CPT

## 2021-08-11 PROCEDURE — 63600175 PHARM REV CODE 636 W HCPCS: Performed by: FAMILY MEDICINE

## 2021-08-11 PROCEDURE — U0002 COVID-19 LAB TEST NON-CDC: HCPCS | Performed by: FAMILY MEDICINE

## 2021-08-11 PROCEDURE — 36415 COLL VENOUS BLD VENIPUNCTURE: CPT | Performed by: NURSE PRACTITIONER

## 2021-08-11 PROCEDURE — 83735 ASSAY OF MAGNESIUM: CPT | Performed by: FAMILY MEDICINE

## 2021-08-11 PROCEDURE — 80053 COMPREHEN METABOLIC PANEL: CPT | Performed by: FAMILY MEDICINE

## 2021-08-11 PROCEDURE — 83690 ASSAY OF LIPASE: CPT | Performed by: FAMILY MEDICINE

## 2021-08-11 PROCEDURE — 25000003 PHARM REV CODE 250: Performed by: FAMILY MEDICINE

## 2021-08-11 PROCEDURE — 84443 ASSAY THYROID STIM HORMONE: CPT | Performed by: NURSE PRACTITIONER

## 2021-08-11 PROCEDURE — 82140 ASSAY OF AMMONIA: CPT | Performed by: NURSE PRACTITIONER

## 2021-08-11 PROCEDURE — 96366 THER/PROPH/DIAG IV INF ADDON: CPT

## 2021-08-11 PROCEDURE — 80053 COMPREHEN METABOLIC PANEL: CPT | Mod: 91 | Performed by: NURSE PRACTITIONER

## 2021-08-11 PROCEDURE — 82306 VITAMIN D 25 HYDROXY: CPT | Performed by: NURSE PRACTITIONER

## 2021-08-11 PROCEDURE — G0378 HOSPITAL OBSERVATION PER HR: HCPCS

## 2021-08-11 PROCEDURE — 25500020 PHARM REV CODE 255: Performed by: FAMILY MEDICINE

## 2021-08-11 PROCEDURE — 83970 ASSAY OF PARATHORMONE: CPT | Performed by: NURSE PRACTITIONER

## 2021-08-11 PROCEDURE — 96376 TX/PRO/DX INJ SAME DRUG ADON: CPT

## 2021-08-11 PROCEDURE — 96368 THER/DIAG CONCURRENT INF: CPT

## 2021-08-11 PROCEDURE — 80074 ACUTE HEPATITIS PANEL: CPT | Performed by: FAMILY MEDICINE

## 2021-08-11 PROCEDURE — 83605 ASSAY OF LACTIC ACID: CPT | Performed by: FAMILY MEDICINE

## 2021-08-11 PROCEDURE — 36415 COLL VENOUS BLD VENIPUNCTURE: CPT | Performed by: FAMILY MEDICINE

## 2021-08-11 PROCEDURE — 96365 THER/PROPH/DIAG IV INF INIT: CPT | Mod: 59

## 2021-08-11 RX ORDER — ONDANSETRON 2 MG/ML
4 INJECTION INTRAMUSCULAR; INTRAVENOUS
Status: COMPLETED | OUTPATIENT
Start: 2021-08-11 | End: 2021-08-11

## 2021-08-11 RX ORDER — SODIUM CHLORIDE 0.9 % (FLUSH) 0.9 %
10 SYRINGE (ML) INJECTION EVERY 8 HOURS PRN
Status: DISCONTINUED | OUTPATIENT
Start: 2021-08-11 | End: 2021-08-14 | Stop reason: HOSPADM

## 2021-08-11 RX ORDER — ACETAMINOPHEN 325 MG/1
650 TABLET ORAL EVERY 8 HOURS PRN
Status: DISCONTINUED | OUTPATIENT
Start: 2021-08-11 | End: 2021-08-14 | Stop reason: HOSPADM

## 2021-08-11 RX ORDER — POTASSIUM CHLORIDE 20 MEQ/1
40 TABLET, EXTENDED RELEASE ORAL
Status: COMPLETED | OUTPATIENT
Start: 2021-08-11 | End: 2021-08-11

## 2021-08-11 RX ORDER — IBUPROFEN 200 MG
16 TABLET ORAL
Status: DISCONTINUED | OUTPATIENT
Start: 2021-08-11 | End: 2021-08-14 | Stop reason: HOSPADM

## 2021-08-11 RX ORDER — PIPERACILLIN SODIUM, TAZOBACTAM SODIUM 3; .375 G/15ML; G/15ML
3.38 INJECTION, POWDER, LYOPHILIZED, FOR SOLUTION INTRAVENOUS
Status: DISCONTINUED | OUTPATIENT
Start: 2021-08-11 | End: 2021-08-11

## 2021-08-11 RX ORDER — TALC
6 POWDER (GRAM) TOPICAL NIGHTLY PRN
Status: DISCONTINUED | OUTPATIENT
Start: 2021-08-11 | End: 2021-08-14 | Stop reason: HOSPADM

## 2021-08-11 RX ORDER — GLUCAGON 1 MG
1 KIT INJECTION
Status: DISCONTINUED | OUTPATIENT
Start: 2021-08-11 | End: 2021-08-14 | Stop reason: HOSPADM

## 2021-08-11 RX ORDER — PROMETHAZINE HYDROCHLORIDE 25 MG/1
25 SUPPOSITORY RECTAL
Status: COMPLETED | OUTPATIENT
Start: 2021-08-11 | End: 2021-08-11

## 2021-08-11 RX ORDER — SODIUM,POTASSIUM PHOSPHATES 280-250MG
2 POWDER IN PACKET (EA) ORAL
Status: DISCONTINUED | OUTPATIENT
Start: 2021-08-11 | End: 2021-08-14 | Stop reason: HOSPADM

## 2021-08-11 RX ORDER — AMOXICILLIN 250 MG
1 CAPSULE ORAL DAILY PRN
Status: DISCONTINUED | OUTPATIENT
Start: 2021-08-11 | End: 2021-08-14 | Stop reason: HOSPADM

## 2021-08-11 RX ORDER — LANOLIN ALCOHOL/MO/W.PET/CERES
800 CREAM (GRAM) TOPICAL
Status: DISCONTINUED | OUTPATIENT
Start: 2021-08-11 | End: 2021-08-14 | Stop reason: HOSPADM

## 2021-08-11 RX ORDER — METOCLOPRAMIDE HYDROCHLORIDE 5 MG/ML
10 INJECTION INTRAMUSCULAR; INTRAVENOUS
Status: COMPLETED | OUTPATIENT
Start: 2021-08-11 | End: 2021-08-11

## 2021-08-11 RX ORDER — HYDROMORPHONE HYDROCHLORIDE 2 MG/ML
1 INJECTION, SOLUTION INTRAMUSCULAR; INTRAVENOUS; SUBCUTANEOUS
Status: COMPLETED | OUTPATIENT
Start: 2021-08-11 | End: 2021-08-11

## 2021-08-11 RX ORDER — SODIUM CHLORIDE 9 MG/ML
INJECTION, SOLUTION INTRAVENOUS CONTINUOUS
Status: DISCONTINUED | OUTPATIENT
Start: 2021-08-11 | End: 2021-08-13

## 2021-08-11 RX ORDER — POTASSIUM CHLORIDE 7.45 MG/ML
10 INJECTION INTRAVENOUS
Status: DISPENSED | OUTPATIENT
Start: 2021-08-11 | End: 2021-08-11

## 2021-08-11 RX ORDER — ONDANSETRON 2 MG/ML
8 INJECTION INTRAMUSCULAR; INTRAVENOUS EVERY 8 HOURS PRN
Status: DISCONTINUED | OUTPATIENT
Start: 2021-08-11 | End: 2021-08-14 | Stop reason: HOSPADM

## 2021-08-11 RX ORDER — POTASSIUM CHLORIDE 7.45 MG/ML
10 INJECTION INTRAVENOUS
Status: COMPLETED | OUTPATIENT
Start: 2021-08-11 | End: 2021-08-11

## 2021-08-11 RX ORDER — IBUPROFEN 200 MG
24 TABLET ORAL
Status: DISCONTINUED | OUTPATIENT
Start: 2021-08-11 | End: 2021-08-14 | Stop reason: HOSPADM

## 2021-08-11 RX ORDER — MAGNESIUM SULFATE 1 G/100ML
1 INJECTION INTRAVENOUS
Status: COMPLETED | OUTPATIENT
Start: 2021-08-11 | End: 2021-08-11

## 2021-08-11 RX ORDER — MAGNESIUM SULFATE 1 G/100ML
2 INJECTION INTRAVENOUS
Status: COMPLETED | OUTPATIENT
Start: 2021-08-11 | End: 2021-08-11

## 2021-08-11 RX ADMIN — PROMETHAZINE HYDROCHLORIDE 12.5 MG: 25 INJECTION INTRAMUSCULAR; INTRAVENOUS at 01:08

## 2021-08-11 RX ADMIN — MAGNESIUM SULFATE IN DEXTROSE 1 G: 10 INJECTION, SOLUTION INTRAVENOUS at 02:08

## 2021-08-11 RX ADMIN — SODIUM CHLORIDE: 0.9 INJECTION, SOLUTION INTRAVENOUS at 05:08

## 2021-08-11 RX ADMIN — POTASSIUM CHLORIDE 10 MEQ: 10 INJECTION, SOLUTION INTRAVENOUS at 04:08

## 2021-08-11 RX ADMIN — POTASSIUM CHLORIDE 10 MEQ: 10 INJECTION, SOLUTION INTRAVENOUS at 07:08

## 2021-08-11 RX ADMIN — METOCLOPRAMIDE 10 MG: 5 INJECTION, SOLUTION INTRAMUSCULAR; INTRAVENOUS at 04:08

## 2021-08-11 RX ADMIN — POTASSIUM CHLORIDE 10 MEQ: 7.46 INJECTION, SOLUTION INTRAVENOUS at 02:08

## 2021-08-11 RX ADMIN — MAGNESIUM SULFATE IN DEXTROSE 2 G: 10 INJECTION, SOLUTION INTRAVENOUS at 08:08

## 2021-08-11 RX ADMIN — LIDOCAINE HYDROCHLORIDE: 20 SOLUTION ORAL; TOPICAL at 01:08

## 2021-08-11 RX ADMIN — HYDROMORPHONE HYDROCHLORIDE 1 MG: 2 INJECTION, SOLUTION INTRAMUSCULAR; INTRAVENOUS; SUBCUTANEOUS at 03:08

## 2021-08-11 RX ADMIN — IOHEXOL 75 ML: 350 INJECTION, SOLUTION INTRAVENOUS at 02:08

## 2021-08-11 RX ADMIN — SODIUM CHLORIDE 1000 ML: 0.9 INJECTION, SOLUTION INTRAVENOUS at 04:08

## 2021-08-11 RX ADMIN — ONDANSETRON 4 MG: 2 INJECTION INTRAMUSCULAR; INTRAVENOUS at 04:08

## 2021-08-11 RX ADMIN — PIPERACILLIN AND TAZOBACTAM 3.38 G: 3; .375 INJECTION, POWDER, LYOPHILIZED, FOR SOLUTION INTRAVENOUS; PARENTERAL at 07:08

## 2021-08-11 RX ADMIN — ONDANSETRON 4 MG: 2 INJECTION INTRAMUSCULAR; INTRAVENOUS at 12:08

## 2021-08-11 RX ADMIN — POTASSIUM CHLORIDE 10 MEQ: 10 INJECTION, SOLUTION INTRAVENOUS at 06:08

## 2021-08-11 RX ADMIN — SODIUM CHLORIDE 1000 ML: 0.9 INJECTION, SOLUTION INTRAVENOUS at 12:08

## 2021-08-11 RX ADMIN — PROMETHAZINE HYDROCHLORIDE 25 MG: 25 SUPPOSITORY RECTAL at 04:08

## 2021-08-11 RX ADMIN — POTASSIUM CHLORIDE 40 MEQ: 1500 TABLET, EXTENDED RELEASE ORAL at 02:08

## 2021-08-12 ENCOUNTER — ANESTHESIA EVENT (OUTPATIENT)
Dept: SURGERY | Facility: HOSPITAL | Age: 61
DRG: 418 | End: 2021-08-12

## 2021-08-12 ENCOUNTER — ANESTHESIA EVENT (OUTPATIENT)
Dept: ENDOSCOPY | Facility: HOSPITAL | Age: 61
DRG: 418 | End: 2021-08-12

## 2021-08-12 ENCOUNTER — ANESTHESIA (OUTPATIENT)
Dept: ENDOSCOPY | Facility: HOSPITAL | Age: 61
DRG: 418 | End: 2021-08-12

## 2021-08-12 ENCOUNTER — CLINICAL SUPPORT (OUTPATIENT)
Dept: SMOKING CESSATION | Facility: CLINIC | Age: 61
End: 2021-08-12

## 2021-08-12 DIAGNOSIS — F17.210 CIGARETTE SMOKER: Primary | ICD-10-CM

## 2021-08-12 LAB
25(OH)D3+25(OH)D2 SERPL-MCNC: 31 NG/ML (ref 30–96)
ALBUMIN SERPL BCP-MCNC: 3.1 G/DL (ref 3.5–5.2)
ALP SERPL-CCNC: 153 U/L (ref 55–135)
ALT SERPL W/O P-5'-P-CCNC: 476 U/L (ref 10–44)
ANION GAP SERPL CALC-SCNC: 8 MMOL/L (ref 8–16)
AST SERPL-CCNC: 250 U/L (ref 10–40)
BASOPHILS # BLD AUTO: 0.04 K/UL (ref 0–0.2)
BASOPHILS NFR BLD: 0.4 % (ref 0–1.9)
BILIRUB SERPL-MCNC: 1.4 MG/DL (ref 0.1–1)
BILIRUB UR QL STRIP: NEGATIVE
BUN SERPL-MCNC: 9 MG/DL (ref 6–20)
CALCIUM SERPL-MCNC: 9.2 MG/DL (ref 8.7–10.5)
CHLORIDE SERPL-SCNC: 107 MMOL/L (ref 95–110)
CLARITY UR: CLEAR
CO2 SERPL-SCNC: 22 MMOL/L (ref 23–29)
COLOR UR: YELLOW
CREAT SERPL-MCNC: 0.8 MG/DL (ref 0.5–1.4)
DIFFERENTIAL METHOD: ABNORMAL
EOSINOPHIL # BLD AUTO: 0.3 K/UL (ref 0–0.5)
EOSINOPHIL NFR BLD: 2.5 % (ref 0–8)
ERYTHROCYTE [DISTWIDTH] IN BLOOD BY AUTOMATED COUNT: 13.1 % (ref 11.5–14.5)
EST. GFR  (AFRICAN AMERICAN): >60 ML/MIN/1.73 M^2
EST. GFR  (NON AFRICAN AMERICAN): >60 ML/MIN/1.73 M^2
GLUCOSE SERPL-MCNC: 91 MG/DL (ref 70–110)
GLUCOSE UR QL STRIP: NEGATIVE
HCT VFR BLD AUTO: 37.3 % (ref 40–54)
HGB BLD-MCNC: 12.5 G/DL (ref 14–18)
HGB UR QL STRIP: NEGATIVE
IMM GRANULOCYTES # BLD AUTO: 0.03 K/UL (ref 0–0.04)
IMM GRANULOCYTES NFR BLD AUTO: 0.3 % (ref 0–0.5)
KETONES UR QL STRIP: NEGATIVE
LEUKOCYTE ESTERASE UR QL STRIP: NEGATIVE
LYMPHOCYTES # BLD AUTO: 1.8 K/UL (ref 1–4.8)
LYMPHOCYTES NFR BLD: 16.7 % (ref 18–48)
MAGNESIUM SERPL-MCNC: 1.9 MG/DL (ref 1.6–2.6)
MCH RBC QN AUTO: 28.7 PG (ref 27–31)
MCHC RBC AUTO-ENTMCNC: 33.5 G/DL (ref 32–36)
MCV RBC AUTO: 86 FL (ref 82–98)
MONOCYTES # BLD AUTO: 1 K/UL (ref 0.3–1)
MONOCYTES NFR BLD: 8.8 % (ref 4–15)
NEUTROPHILS # BLD AUTO: 7.8 K/UL (ref 1.8–7.7)
NEUTROPHILS NFR BLD: 71.3 % (ref 38–73)
NITRITE UR QL STRIP: NEGATIVE
NRBC BLD-RTO: 0 /100 WBC
PH UR STRIP: 7 [PH] (ref 5–8)
PHOSPHATE SERPL-MCNC: 2.3 MG/DL (ref 2.7–4.5)
PLATELET # BLD AUTO: 205 K/UL (ref 150–450)
PMV BLD AUTO: 11.6 FL (ref 9.2–12.9)
POTASSIUM SERPL-SCNC: 4.3 MMOL/L (ref 3.5–5.1)
PROT SERPL-MCNC: 6.5 G/DL (ref 6–8.4)
PROT UR QL STRIP: NEGATIVE
RBC # BLD AUTO: 4.36 M/UL (ref 4.6–6.2)
SODIUM SERPL-SCNC: 137 MMOL/L (ref 136–145)
SP GR UR STRIP: 1.01 (ref 1–1.03)
URN SPEC COLLECT METH UR: NORMAL
UROBILINOGEN UR STRIP-ACNC: NEGATIVE EU/DL
WBC # BLD AUTO: 10.86 K/UL (ref 3.9–12.7)

## 2021-08-12 PROCEDURE — 25000003 PHARM REV CODE 250: Performed by: INTERNAL MEDICINE

## 2021-08-12 PROCEDURE — 99406 BEHAV CHNG SMOKING 3-10 MIN: CPT | Mod: S$PBB,,,

## 2021-08-12 PROCEDURE — 96361 HYDRATE IV INFUSION ADD-ON: CPT

## 2021-08-12 PROCEDURE — C1769 GUIDE WIRE: HCPCS | Performed by: INTERNAL MEDICINE

## 2021-08-12 PROCEDURE — 80053 COMPREHEN METABOLIC PANEL: CPT | Performed by: NURSE PRACTITIONER

## 2021-08-12 PROCEDURE — 85025 COMPLETE CBC W/AUTO DIFF WBC: CPT | Performed by: NURSE PRACTITIONER

## 2021-08-12 PROCEDURE — 43262 ENDO CHOLANGIOPANCREATOGRAPH: CPT | Performed by: INTERNAL MEDICINE

## 2021-08-12 PROCEDURE — 99999 PR PBB SHADOW E&M-EST. PATIENT-LVL I: ICD-10-PCS | Mod: PBBFAC,,,

## 2021-08-12 PROCEDURE — 36415 COLL VENOUS BLD VENIPUNCTURE: CPT | Performed by: NURSE PRACTITIONER

## 2021-08-12 PROCEDURE — 11000001 HC ACUTE MED/SURG PRIVATE ROOM

## 2021-08-12 PROCEDURE — 83735 ASSAY OF MAGNESIUM: CPT | Performed by: NURSE PRACTITIONER

## 2021-08-12 PROCEDURE — 25000003 PHARM REV CODE 250: Performed by: NURSE PRACTITIONER

## 2021-08-12 PROCEDURE — 63600175 PHARM REV CODE 636 W HCPCS: Performed by: INTERNAL MEDICINE

## 2021-08-12 PROCEDURE — 81003 URINALYSIS AUTO W/O SCOPE: CPT | Performed by: INTERNAL MEDICINE

## 2021-08-12 PROCEDURE — 99406 PT REFUSED TOBACCO CESSATION: ICD-10-PCS | Mod: S$PBB,,,

## 2021-08-12 PROCEDURE — 37000009 HC ANESTHESIA EA ADD 15 MINS: Performed by: INTERNAL MEDICINE

## 2021-08-12 PROCEDURE — 63600175 PHARM REV CODE 636 W HCPCS: Performed by: NURSE ANESTHETIST, CERTIFIED REGISTERED

## 2021-08-12 PROCEDURE — 84100 ASSAY OF PHOSPHORUS: CPT | Performed by: NURSE PRACTITIONER

## 2021-08-12 PROCEDURE — 27201674 HC SPHINCTERTOME: Performed by: INTERNAL MEDICINE

## 2021-08-12 PROCEDURE — 43264 ERCP REMOVE DUCT CALCULI: CPT | Performed by: INTERNAL MEDICINE

## 2021-08-12 PROCEDURE — 25000003 PHARM REV CODE 250: Performed by: NURSE ANESTHETIST, CERTIFIED REGISTERED

## 2021-08-12 PROCEDURE — 99999 PR PBB SHADOW E&M-EST. PATIENT-LVL I: CPT | Mod: PBBFAC,,,

## 2021-08-12 PROCEDURE — 25500020 PHARM REV CODE 255: Performed by: INTERNAL MEDICINE

## 2021-08-12 PROCEDURE — 37000008 HC ANESTHESIA 1ST 15 MINUTES: Performed by: INTERNAL MEDICINE

## 2021-08-12 RX ORDER — HYDRALAZINE HYDROCHLORIDE 20 MG/ML
10 INJECTION INTRAMUSCULAR; INTRAVENOUS ONCE
Status: COMPLETED | OUTPATIENT
Start: 2021-08-12 | End: 2021-08-12

## 2021-08-12 RX ORDER — HYDRALAZINE HYDROCHLORIDE 20 MG/ML
20 INJECTION INTRAMUSCULAR; INTRAVENOUS ONCE
Status: DISCONTINUED | OUTPATIENT
Start: 2021-08-12 | End: 2021-08-12

## 2021-08-12 RX ORDER — INDOMETHACIN 50 MG/1
SUPPOSITORY RECTAL
Status: COMPLETED | OUTPATIENT
Start: 2021-08-12 | End: 2021-08-12

## 2021-08-12 RX ORDER — LIDOCAINE HCL/PF 100 MG/5ML
SYRINGE (ML) INTRAVENOUS
Status: DISCONTINUED | OUTPATIENT
Start: 2021-08-12 | End: 2021-08-12

## 2021-08-12 RX ORDER — PROPOFOL 10 MG/ML
INJECTION, EMULSION INTRAVENOUS CONTINUOUS PRN
Status: DISCONTINUED | OUTPATIENT
Start: 2021-08-12 | End: 2021-08-12

## 2021-08-12 RX ORDER — FENTANYL CITRATE 50 UG/ML
INJECTION, SOLUTION INTRAMUSCULAR; INTRAVENOUS
Status: DISCONTINUED | OUTPATIENT
Start: 2021-08-12 | End: 2021-08-12

## 2021-08-12 RX ORDER — LABETALOL HYDROCHLORIDE 5 MG/ML
10 INJECTION, SOLUTION INTRAVENOUS EVERY 4 HOURS PRN
Status: DISCONTINUED | OUTPATIENT
Start: 2021-08-12 | End: 2021-08-14 | Stop reason: HOSPADM

## 2021-08-12 RX ORDER — HYDRALAZINE HYDROCHLORIDE 20 MG/ML
10 INJECTION INTRAMUSCULAR; INTRAVENOUS EVERY 4 HOURS PRN
Status: DISCONTINUED | OUTPATIENT
Start: 2021-08-12 | End: 2021-08-13 | Stop reason: SDUPTHER

## 2021-08-12 RX ADMIN — LIDOCAINE HYDROCHLORIDE 50 MG: 20 INJECTION, SOLUTION INTRAVENOUS at 02:08

## 2021-08-12 RX ADMIN — SODIUM CHLORIDE: 0.9 INJECTION, SOLUTION INTRAVENOUS at 04:08

## 2021-08-12 RX ADMIN — HYDRALAZINE HYDROCHLORIDE 10 MG: 20 INJECTION, SOLUTION INTRAMUSCULAR; INTRAVENOUS at 06:08

## 2021-08-12 RX ADMIN — PIPERACILLIN AND TAZOBACTAM 4.5 G: 4; .5 INJECTION, POWDER, FOR SOLUTION INTRAVENOUS at 11:08

## 2021-08-12 RX ADMIN — SODIUM CHLORIDE: 0.9 INJECTION, SOLUTION INTRAVENOUS at 11:08

## 2021-08-12 RX ADMIN — INDOMETHACIN 100 MG: 50 SUPPOSITORY RECTAL at 03:08

## 2021-08-12 RX ADMIN — SODIUM CHLORIDE, SODIUM LACTATE, POTASSIUM CHLORIDE, AND CALCIUM CHLORIDE: .6; .31; .03; .02 INJECTION, SOLUTION INTRAVENOUS at 03:08

## 2021-08-12 RX ADMIN — PIPERACILLIN AND TAZOBACTAM 4.5 G: 4; .5 INJECTION, POWDER, FOR SOLUTION INTRAVENOUS at 06:08

## 2021-08-12 RX ADMIN — GLYCOPYRROLATE 0.2 MG: 0.2 INJECTION, SOLUTION INTRAMUSCULAR; INTRAVITREAL at 02:08

## 2021-08-12 RX ADMIN — FENTANYL CITRATE 25 MCG: 50 INJECTION, SOLUTION INTRAMUSCULAR; INTRAVENOUS at 03:08

## 2021-08-12 RX ADMIN — FENTANYL CITRATE 25 MCG: 50 INJECTION, SOLUTION INTRAMUSCULAR; INTRAVENOUS at 02:08

## 2021-08-12 RX ADMIN — IOHEXOL 5 ML: 300 INJECTION, SOLUTION INTRAVENOUS at 03:08

## 2021-08-12 RX ADMIN — PROPOFOL 150 MCG/KG/MIN: 10 INJECTION, EMULSION INTRAVENOUS at 02:08

## 2021-08-13 ENCOUNTER — CLINICAL SUPPORT (OUTPATIENT)
Dept: SMOKING CESSATION | Facility: CLINIC | Age: 61
End: 2021-08-13

## 2021-08-13 ENCOUNTER — ANESTHESIA (OUTPATIENT)
Dept: SURGERY | Facility: HOSPITAL | Age: 61
DRG: 418 | End: 2021-08-13

## 2021-08-13 DIAGNOSIS — F17.210 CIGARETTE SMOKER: Primary | ICD-10-CM

## 2021-08-13 PROBLEM — K80.42 CHOLEDOCHOLITHIASIS WITH ACUTE CHOLECYSTITIS: Status: ACTIVE | Noted: 2021-08-13

## 2021-08-13 PROBLEM — K83.09 CHOLANGITIS: Status: ACTIVE | Noted: 2021-08-13

## 2021-08-13 LAB
ALBUMIN SERPL BCP-MCNC: 2.9 G/DL (ref 3.5–5.2)
ALP SERPL-CCNC: 124 U/L (ref 55–135)
ALT SERPL W/O P-5'-P-CCNC: 287 U/L (ref 10–44)
ANION GAP SERPL CALC-SCNC: 7 MMOL/L (ref 8–16)
AST SERPL-CCNC: 83 U/L (ref 10–40)
BASOPHILS # BLD AUTO: 0.06 K/UL (ref 0–0.2)
BASOPHILS NFR BLD: 0.7 % (ref 0–1.9)
BILIRUB SERPL-MCNC: 0.6 MG/DL (ref 0.1–1)
BUN SERPL-MCNC: 11 MG/DL (ref 6–20)
CALCIUM SERPL-MCNC: 8.8 MG/DL (ref 8.7–10.5)
CHLORIDE SERPL-SCNC: 109 MMOL/L (ref 95–110)
CO2 SERPL-SCNC: 21 MMOL/L (ref 23–29)
CREAT SERPL-MCNC: 0.8 MG/DL (ref 0.5–1.4)
DIFFERENTIAL METHOD: ABNORMAL
EOSINOPHIL # BLD AUTO: 0.6 K/UL (ref 0–0.5)
EOSINOPHIL NFR BLD: 6.5 % (ref 0–8)
ERYTHROCYTE [DISTWIDTH] IN BLOOD BY AUTOMATED COUNT: 12.9 % (ref 11.5–14.5)
EST. GFR  (AFRICAN AMERICAN): >60 ML/MIN/1.73 M^2
EST. GFR  (NON AFRICAN AMERICAN): >60 ML/MIN/1.73 M^2
GLUCOSE SERPL-MCNC: 77 MG/DL (ref 70–110)
HCT VFR BLD AUTO: 34.9 % (ref 40–54)
HGB BLD-MCNC: 11.7 G/DL (ref 14–18)
IMM GRANULOCYTES # BLD AUTO: 0.03 K/UL (ref 0–0.04)
IMM GRANULOCYTES NFR BLD AUTO: 0.4 % (ref 0–0.5)
LYMPHOCYTES # BLD AUTO: 2.3 K/UL (ref 1–4.8)
LYMPHOCYTES NFR BLD: 26.7 % (ref 18–48)
MCH RBC QN AUTO: 28.3 PG (ref 27–31)
MCHC RBC AUTO-ENTMCNC: 33.5 G/DL (ref 32–36)
MCV RBC AUTO: 85 FL (ref 82–98)
MONOCYTES # BLD AUTO: 0.9 K/UL (ref 0.3–1)
MONOCYTES NFR BLD: 10.2 % (ref 4–15)
NEUTROPHILS # BLD AUTO: 4.7 K/UL (ref 1.8–7.7)
NEUTROPHILS NFR BLD: 55.5 % (ref 38–73)
NRBC BLD-RTO: 0 /100 WBC
PLATELET # BLD AUTO: 199 K/UL (ref 150–450)
PMV BLD AUTO: 11.5 FL (ref 9.2–12.9)
POTASSIUM SERPL-SCNC: 3.6 MMOL/L (ref 3.5–5.1)
PROT SERPL-MCNC: 6 G/DL (ref 6–8.4)
RBC # BLD AUTO: 4.13 M/UL (ref 4.6–6.2)
SODIUM SERPL-SCNC: 137 MMOL/L (ref 136–145)
WBC # BLD AUTO: 8.45 K/UL (ref 3.9–12.7)

## 2021-08-13 PROCEDURE — 25000003 PHARM REV CODE 250: Performed by: NURSE ANESTHETIST, CERTIFIED REGISTERED

## 2021-08-13 PROCEDURE — 63600175 PHARM REV CODE 636 W HCPCS: Performed by: NURSE ANESTHETIST, CERTIFIED REGISTERED

## 2021-08-13 PROCEDURE — S4991 NICOTINE PATCH NONLEGEND: HCPCS | Performed by: INTERNAL MEDICINE

## 2021-08-13 PROCEDURE — 11000001 HC ACUTE MED/SURG PRIVATE ROOM

## 2021-08-13 PROCEDURE — 36415 COLL VENOUS BLD VENIPUNCTURE: CPT | Performed by: NURSE PRACTITIONER

## 2021-08-13 PROCEDURE — 25000003 PHARM REV CODE 250: Performed by: NURSE PRACTITIONER

## 2021-08-13 PROCEDURE — 88304 PR  SURG PATH,LEVEL III: ICD-10-PCS | Mod: 26,,, | Performed by: PATHOLOGY

## 2021-08-13 PROCEDURE — 37000008 HC ANESTHESIA 1ST 15 MINUTES: Performed by: SURGERY

## 2021-08-13 PROCEDURE — 63600175 PHARM REV CODE 636 W HCPCS: Performed by: ANESTHESIOLOGY

## 2021-08-13 PROCEDURE — 88304 TISSUE EXAM BY PATHOLOGIST: CPT | Mod: 26,,, | Performed by: PATHOLOGY

## 2021-08-13 PROCEDURE — 63600175 PHARM REV CODE 636 W HCPCS: Performed by: INTERNAL MEDICINE

## 2021-08-13 PROCEDURE — 63600175 PHARM REV CODE 636 W HCPCS: Performed by: SURGERY

## 2021-08-13 PROCEDURE — 37000009 HC ANESTHESIA EA ADD 15 MINS: Performed by: SURGERY

## 2021-08-13 PROCEDURE — 80053 COMPREHEN METABOLIC PANEL: CPT | Performed by: NURSE PRACTITIONER

## 2021-08-13 PROCEDURE — C9290 INJ, BUPIVACAINE LIPOSOME: HCPCS | Performed by: SURGERY

## 2021-08-13 PROCEDURE — 94799 UNLISTED PULMONARY SVC/PX: CPT

## 2021-08-13 PROCEDURE — 25000003 PHARM REV CODE 250: Performed by: INTERNAL MEDICINE

## 2021-08-13 PROCEDURE — 71000033 HC RECOVERY, INTIAL HOUR: Performed by: SURGERY

## 2021-08-13 PROCEDURE — 85025 COMPLETE CBC W/AUTO DIFF WBC: CPT | Performed by: NURSE PRACTITIONER

## 2021-08-13 PROCEDURE — 25000003 PHARM REV CODE 250: Performed by: SURGERY

## 2021-08-13 PROCEDURE — 71000039 HC RECOVERY, EACH ADD'L HOUR: Performed by: SURGERY

## 2021-08-13 PROCEDURE — 99406 PT REFUSED TOBACCO CESSATION: ICD-10-PCS | Mod: S$PBB,,,

## 2021-08-13 PROCEDURE — 36000709 HC OR TIME LEV III EA ADD 15 MIN: Performed by: SURGERY

## 2021-08-13 PROCEDURE — 36000708 HC OR TIME LEV III 1ST 15 MIN: Performed by: SURGERY

## 2021-08-13 PROCEDURE — 88304 TISSUE EXAM BY PATHOLOGIST: CPT | Performed by: PATHOLOGY

## 2021-08-13 PROCEDURE — 27201423 OPTIME MED/SURG SUP & DEVICES STERILE SUPPLY: Performed by: SURGERY

## 2021-08-13 PROCEDURE — 99406 BEHAV CHNG SMOKING 3-10 MIN: CPT | Mod: S$PBB,,,

## 2021-08-13 RX ORDER — NEOSTIGMINE METHYLSULFATE 1 MG/ML
INJECTION, SOLUTION INTRAVENOUS
Status: DISCONTINUED | OUTPATIENT
Start: 2021-08-13 | End: 2021-08-13

## 2021-08-13 RX ORDER — IBUPROFEN 200 MG
1 TABLET ORAL DAILY
Status: DISCONTINUED | OUTPATIENT
Start: 2021-08-13 | End: 2021-08-14 | Stop reason: HOSPADM

## 2021-08-13 RX ORDER — DEXAMETHASONE SODIUM PHOSPHATE 4 MG/ML
INJECTION, SOLUTION INTRA-ARTICULAR; INTRALESIONAL; INTRAMUSCULAR; INTRAVENOUS; SOFT TISSUE
Status: DISCONTINUED | OUTPATIENT
Start: 2021-08-13 | End: 2021-08-13

## 2021-08-13 RX ORDER — LIDOCAINE HYDROCHLORIDE 20 MG/ML
INJECTION INTRAVENOUS
Status: DISCONTINUED | OUTPATIENT
Start: 2021-08-13 | End: 2021-08-13

## 2021-08-13 RX ORDER — OXYCODONE HYDROCHLORIDE 5 MG/1
5 TABLET ORAL
Status: DISCONTINUED | OUTPATIENT
Start: 2021-08-13 | End: 2021-08-14 | Stop reason: HOSPADM

## 2021-08-13 RX ORDER — PROPOFOL 10 MG/ML
VIAL (ML) INTRAVENOUS
Status: DISCONTINUED | OUTPATIENT
Start: 2021-08-13 | End: 2021-08-13

## 2021-08-13 RX ORDER — MORPHINE SULFATE 4 MG/ML
4 INJECTION, SOLUTION INTRAMUSCULAR; INTRAVENOUS EVERY 4 HOURS PRN
Status: DISCONTINUED | OUTPATIENT
Start: 2021-08-13 | End: 2021-08-14 | Stop reason: HOSPADM

## 2021-08-13 RX ORDER — HYDRALAZINE HYDROCHLORIDE 20 MG/ML
10 INJECTION INTRAMUSCULAR; INTRAVENOUS ONCE
Status: COMPLETED | OUTPATIENT
Start: 2021-08-13 | End: 2021-08-13

## 2021-08-13 RX ORDER — BUPIVACAINE HYDROCHLORIDE 2.5 MG/ML
INJECTION, SOLUTION EPIDURAL; INFILTRATION; INTRACAUDAL
Status: DISCONTINUED | OUTPATIENT
Start: 2021-08-13 | End: 2021-08-13 | Stop reason: HOSPADM

## 2021-08-13 RX ORDER — HYDROMORPHONE HYDROCHLORIDE 2 MG/ML
0.5 INJECTION, SOLUTION INTRAMUSCULAR; INTRAVENOUS; SUBCUTANEOUS EVERY 5 MIN PRN
Status: DISCONTINUED | OUTPATIENT
Start: 2021-08-13 | End: 2021-08-14 | Stop reason: HOSPADM

## 2021-08-13 RX ORDER — MIDAZOLAM HYDROCHLORIDE 1 MG/ML
INJECTION, SOLUTION INTRAMUSCULAR; INTRAVENOUS
Status: DISCONTINUED | OUTPATIENT
Start: 2021-08-13 | End: 2021-08-13

## 2021-08-13 RX ORDER — ROCURONIUM BROMIDE 10 MG/ML
INJECTION, SOLUTION INTRAVENOUS
Status: DISCONTINUED | OUTPATIENT
Start: 2021-08-13 | End: 2021-08-13

## 2021-08-13 RX ORDER — ONDANSETRON 2 MG/ML
4 INJECTION INTRAMUSCULAR; INTRAVENOUS DAILY PRN
Status: DISCONTINUED | OUTPATIENT
Start: 2021-08-13 | End: 2021-08-14 | Stop reason: HOSPADM

## 2021-08-13 RX ORDER — SUCCINYLCHOLINE CHLORIDE 20 MG/ML
INJECTION INTRAMUSCULAR; INTRAVENOUS
Status: DISCONTINUED | OUTPATIENT
Start: 2021-08-13 | End: 2021-08-13

## 2021-08-13 RX ORDER — HYDRALAZINE HYDROCHLORIDE 20 MG/ML
10 INJECTION INTRAMUSCULAR; INTRAVENOUS EVERY 4 HOURS PRN
Status: DISCONTINUED | OUTPATIENT
Start: 2021-08-13 | End: 2021-08-14 | Stop reason: HOSPADM

## 2021-08-13 RX ORDER — AMLODIPINE BESYLATE 5 MG/1
5 TABLET ORAL DAILY
Status: DISCONTINUED | OUTPATIENT
Start: 2021-08-13 | End: 2021-08-14 | Stop reason: HOSPADM

## 2021-08-13 RX ORDER — ONDANSETRON 2 MG/ML
INJECTION INTRAMUSCULAR; INTRAVENOUS
Status: DISCONTINUED | OUTPATIENT
Start: 2021-08-13 | End: 2021-08-13

## 2021-08-13 RX ORDER — FENTANYL CITRATE 50 UG/ML
INJECTION, SOLUTION INTRAMUSCULAR; INTRAVENOUS
Status: DISCONTINUED | OUTPATIENT
Start: 2021-08-13 | End: 2021-08-13

## 2021-08-13 RX ORDER — OXYCODONE AND ACETAMINOPHEN 5; 325 MG/1; MG/1
1 TABLET ORAL EVERY 4 HOURS PRN
Status: DISCONTINUED | OUTPATIENT
Start: 2021-08-13 | End: 2021-08-14 | Stop reason: HOSPADM

## 2021-08-13 RX ADMIN — SODIUM CHLORIDE: 0.9 INJECTION, SOLUTION INTRAVENOUS at 07:08

## 2021-08-13 RX ADMIN — ROCURONIUM BROMIDE 10 MG: 10 INJECTION, SOLUTION INTRAVENOUS at 03:08

## 2021-08-13 RX ADMIN — FENTANYL CITRATE 100 MCG: 50 INJECTION, SOLUTION INTRAMUSCULAR; INTRAVENOUS at 03:08

## 2021-08-13 RX ADMIN — NEOSTIGMINE METHYLSULFATE 4 MG: 1 INJECTION INTRAVENOUS at 04:08

## 2021-08-13 RX ADMIN — PIPERACILLIN AND TAZOBACTAM 4.5 G: 4; .5 INJECTION, POWDER, FOR SOLUTION INTRAVENOUS at 03:08

## 2021-08-13 RX ADMIN — HYDROMORPHONE HYDROCHLORIDE 0.5 MG: 2 INJECTION INTRAMUSCULAR; INTRAVENOUS; SUBCUTANEOUS at 05:08

## 2021-08-13 RX ADMIN — GLYCOPYRROLATE 0.4 MG: 0.2 INJECTION, SOLUTION INTRAMUSCULAR; INTRAVITREAL at 04:08

## 2021-08-13 RX ADMIN — PIPERACILLIN AND TAZOBACTAM 4.5 G: 4; .5 INJECTION, POWDER, FOR SOLUTION INTRAVENOUS at 11:08

## 2021-08-13 RX ADMIN — AMLODIPINE BESYLATE 5 MG: 5 TABLET ORAL at 11:08

## 2021-08-13 RX ADMIN — LIDOCAINE HYDROCHLORIDE 100 MG: 20 INJECTION, SOLUTION INTRAVENOUS at 03:08

## 2021-08-13 RX ADMIN — SUCCINYLCHOLINE CHLORIDE 140 MG: 20 INJECTION, SOLUTION INTRAMUSCULAR; INTRAVENOUS at 03:08

## 2021-08-13 RX ADMIN — FENTANYL CITRATE 50 MCG: 50 INJECTION, SOLUTION INTRAMUSCULAR; INTRAVENOUS at 03:08

## 2021-08-13 RX ADMIN — FENTANYL CITRATE 50 MCG: 50 INJECTION, SOLUTION INTRAMUSCULAR; INTRAVENOUS at 04:08

## 2021-08-13 RX ADMIN — Medication 1 PATCH: at 08:08

## 2021-08-13 RX ADMIN — AMPICILLIN SODIUM AND SULBACTAM SODIUM 3 G: 2; 1 INJECTION, POWDER, FOR SOLUTION INTRAMUSCULAR; INTRAVENOUS at 03:08

## 2021-08-13 RX ADMIN — SODIUM CHLORIDE, SODIUM LACTATE, POTASSIUM CHLORIDE, AND CALCIUM CHLORIDE: .6; .31; .03; .02 INJECTION, SOLUTION INTRAVENOUS at 03:08

## 2021-08-13 RX ADMIN — PROPOFOL 150 MG: 10 INJECTION, EMULSION INTRAVENOUS at 03:08

## 2021-08-13 RX ADMIN — PIPERACILLIN AND TAZOBACTAM 4.5 G: 4; .5 INJECTION, POWDER, FOR SOLUTION INTRAVENOUS at 06:08

## 2021-08-13 RX ADMIN — ROCURONIUM BROMIDE 40 MG: 10 INJECTION, SOLUTION INTRAVENOUS at 03:08

## 2021-08-13 RX ADMIN — ONDANSETRON 8 MG: 2 INJECTION, SOLUTION INTRAMUSCULAR; INTRAVENOUS at 04:08

## 2021-08-13 RX ADMIN — HYDRALAZINE HYDROCHLORIDE 10 MG: 20 INJECTION, SOLUTION INTRAMUSCULAR; INTRAVENOUS at 05:08

## 2021-08-13 RX ADMIN — DEXAMETHASONE SODIUM PHOSPHATE 8 MG: 4 INJECTION, SOLUTION INTRA-ARTICULAR; INTRALESIONAL; INTRAMUSCULAR; INTRAVENOUS; SOFT TISSUE at 03:08

## 2021-08-13 RX ADMIN — MIDAZOLAM 2 MG: 1 INJECTION INTRAMUSCULAR; INTRAVENOUS at 03:08

## 2021-08-14 VITALS
HEIGHT: 75 IN | DIASTOLIC BLOOD PRESSURE: 76 MMHG | SYSTOLIC BLOOD PRESSURE: 124 MMHG | HEART RATE: 73 BPM | OXYGEN SATURATION: 99 % | TEMPERATURE: 98 F | RESPIRATION RATE: 17 BRPM | WEIGHT: 140 LBS | BODY MASS INDEX: 17.41 KG/M2

## 2021-08-14 LAB
ALBUMIN SERPL BCP-MCNC: 3.2 G/DL (ref 3.5–5.2)
ALP SERPL-CCNC: 128 U/L (ref 55–135)
ALT SERPL W/O P-5'-P-CCNC: 245 U/L (ref 10–44)
ANION GAP SERPL CALC-SCNC: 12 MMOL/L (ref 8–16)
AST SERPL-CCNC: 64 U/L (ref 10–40)
BASOPHILS # BLD AUTO: 0.02 K/UL (ref 0–0.2)
BASOPHILS NFR BLD: 0.2 % (ref 0–1.9)
BILIRUB SERPL-MCNC: 0.9 MG/DL (ref 0.1–1)
BUN SERPL-MCNC: 12 MG/DL (ref 6–20)
CALCIUM SERPL-MCNC: 9.5 MG/DL (ref 8.7–10.5)
CHLORIDE SERPL-SCNC: 100 MMOL/L (ref 95–110)
CO2 SERPL-SCNC: 22 MMOL/L (ref 23–29)
CREAT SERPL-MCNC: 0.9 MG/DL (ref 0.5–1.4)
DIFFERENTIAL METHOD: ABNORMAL
EOSINOPHIL # BLD AUTO: 0 K/UL (ref 0–0.5)
EOSINOPHIL NFR BLD: 0 % (ref 0–8)
ERYTHROCYTE [DISTWIDTH] IN BLOOD BY AUTOMATED COUNT: 12.8 % (ref 11.5–14.5)
EST. GFR  (AFRICAN AMERICAN): >60 ML/MIN/1.73 M^2
EST. GFR  (NON AFRICAN AMERICAN): >60 ML/MIN/1.73 M^2
GLUCOSE SERPL-MCNC: 96 MG/DL (ref 70–110)
HCT VFR BLD AUTO: 39.5 % (ref 40–54)
HGB BLD-MCNC: 13.4 G/DL (ref 14–18)
IMM GRANULOCYTES # BLD AUTO: 0.03 K/UL (ref 0–0.04)
IMM GRANULOCYTES NFR BLD AUTO: 0.3 % (ref 0–0.5)
LYMPHOCYTES # BLD AUTO: 1.1 K/UL (ref 1–4.8)
LYMPHOCYTES NFR BLD: 9.9 % (ref 18–48)
MCH RBC QN AUTO: 28.7 PG (ref 27–31)
MCHC RBC AUTO-ENTMCNC: 33.9 G/DL (ref 32–36)
MCV RBC AUTO: 85 FL (ref 82–98)
MONOCYTES # BLD AUTO: 0.5 K/UL (ref 0.3–1)
MONOCYTES NFR BLD: 4 % (ref 4–15)
NEUTROPHILS # BLD AUTO: 9.5 K/UL (ref 1.8–7.7)
NEUTROPHILS NFR BLD: 85.6 % (ref 38–73)
NRBC BLD-RTO: 0 /100 WBC
PLATELET # BLD AUTO: 227 K/UL (ref 150–450)
PMV BLD AUTO: 11.2 FL (ref 9.2–12.9)
POTASSIUM SERPL-SCNC: 4 MMOL/L (ref 3.5–5.1)
PROT SERPL-MCNC: 6.9 G/DL (ref 6–8.4)
RBC # BLD AUTO: 4.67 M/UL (ref 4.6–6.2)
SODIUM SERPL-SCNC: 134 MMOL/L (ref 136–145)
WBC # BLD AUTO: 11.14 K/UL (ref 3.9–12.7)

## 2021-08-14 PROCEDURE — 94761 N-INVAS EAR/PLS OXIMETRY MLT: CPT

## 2021-08-14 PROCEDURE — 25000003 PHARM REV CODE 250: Performed by: INTERNAL MEDICINE

## 2021-08-14 PROCEDURE — 63600175 PHARM REV CODE 636 W HCPCS: Performed by: FAMILY MEDICINE

## 2021-08-14 PROCEDURE — 80053 COMPREHEN METABOLIC PANEL: CPT | Performed by: NURSE PRACTITIONER

## 2021-08-14 PROCEDURE — 99900035 HC TECH TIME PER 15 MIN (STAT)

## 2021-08-14 PROCEDURE — 85025 COMPLETE CBC W/AUTO DIFF WBC: CPT | Performed by: NURSE PRACTITIONER

## 2021-08-14 PROCEDURE — S4991 NICOTINE PATCH NONLEGEND: HCPCS | Performed by: SURGERY

## 2021-08-14 PROCEDURE — 25000003 PHARM REV CODE 250: Performed by: SURGERY

## 2021-08-14 PROCEDURE — 36415 COLL VENOUS BLD VENIPUNCTURE: CPT | Performed by: NURSE PRACTITIONER

## 2021-08-14 PROCEDURE — 63600175 PHARM REV CODE 636 W HCPCS: Performed by: INTERNAL MEDICINE

## 2021-08-14 PROCEDURE — 94799 UNLISTED PULMONARY SVC/PX: CPT

## 2021-08-14 RX ORDER — IBUPROFEN 200 MG
1 TABLET ORAL DAILY
Qty: 28 PATCH | Refills: 0 | Status: SHIPPED | OUTPATIENT
Start: 2021-08-15

## 2021-08-14 RX ORDER — AMLODIPINE BESYLATE 5 MG/1
5 TABLET ORAL DAILY
Qty: 30 TABLET | Refills: 11 | Status: SHIPPED | OUTPATIENT
Start: 2021-08-15 | End: 2022-08-15

## 2021-08-14 RX ORDER — ENOXAPARIN SODIUM 100 MG/ML
40 INJECTION SUBCUTANEOUS EVERY 24 HOURS
Status: DISCONTINUED | OUTPATIENT
Start: 2021-08-14 | End: 2021-08-14 | Stop reason: HOSPADM

## 2021-08-14 RX ORDER — SODIUM CHLORIDE 9 MG/ML
INJECTION, SOLUTION INTRAVENOUS
Status: DISCONTINUED | OUTPATIENT
Start: 2021-08-14 | End: 2021-08-14 | Stop reason: HOSPADM

## 2021-08-14 RX ORDER — CIPROFLOXACIN 500 MG/1
500 TABLET ORAL EVERY 12 HOURS
Qty: 10 TABLET | Refills: 0 | Status: SHIPPED | OUTPATIENT
Start: 2021-08-14 | End: 2021-08-19

## 2021-08-14 RX ADMIN — PIPERACILLIN AND TAZOBACTAM 4.5 G: 4; .5 INJECTION, POWDER, FOR SOLUTION INTRAVENOUS at 03:08

## 2021-08-14 RX ADMIN — SODIUM CHLORIDE: 0.9 INJECTION, SOLUTION INTRAVENOUS at 03:08

## 2021-08-14 RX ADMIN — ENOXAPARIN SODIUM 40 MG: 100 INJECTION SUBCUTANEOUS at 09:08

## 2021-08-14 RX ADMIN — AMLODIPINE BESYLATE 5 MG: 5 TABLET ORAL at 09:08

## 2021-08-14 RX ADMIN — Medication 1 PATCH: at 09:08

## 2021-08-14 RX ADMIN — PIPERACILLIN AND TAZOBACTAM 4.5 G: 4; .5 INJECTION, POWDER, FOR SOLUTION INTRAVENOUS at 12:08

## 2021-08-16 LAB — 1,25(OH)2D3 SERPL-MCNC: 53 PG/ML (ref 20–79)

## 2021-08-18 LAB
FINAL PATHOLOGIC DIAGNOSIS: NORMAL
GROSS: NORMAL
Lab: NORMAL

## 2021-08-23 ENCOUNTER — TELEPHONE (OUTPATIENT)
Dept: SMOKING CESSATION | Facility: CLINIC | Age: 61
End: 2021-08-23

## (undated) DEVICE — SUPPORT ULNA NERVE PROTECTOR

## (undated) DEVICE — NDL HYPDRM 23X15

## (undated) DEVICE — GLOVE SURG BIOGEL LATEX SZ 7.5

## (undated) DEVICE — SUT 0 VICRYL / UR6 (J603)

## (undated) DEVICE — TROCAR ENDOPATH XCEL 11MM 10CM

## (undated) DEVICE — IRRIGATOR ENDOSCOPY DISP.

## (undated) DEVICE — SOL IRR NACL .9% 3000ML

## (undated) DEVICE — DISSECTOR EPIX LAPA 5MMX35CM

## (undated) DEVICE — APPLIER CLIP ENDO MED/LG 10MM

## (undated) DEVICE — DRAPE STERI INSTRUMENT 1018

## (undated) DEVICE — DRAPE INCISE IOBAN 2 23X23IN

## (undated) DEVICE — CONTAINER MULTIPURPOSE/SPECIME

## (undated) DEVICE — TROCAR ENDOPATH XCEL 5X75MM

## (undated) DEVICE — TROCAR ENDOPATH XCEL 5MM 7.5CM

## (undated) DEVICE — ADHESIVE DERMABOND ADVANCED

## (undated) DEVICE — MANIFOLD 4 PORT

## (undated) DEVICE — BAG TISSUE RETRIEVAL 225ML

## (undated) DEVICE — SUT MONOCRYL 3-0 PS-2 UND

## (undated) DEVICE — TROCAR ENDOPATH XCEL 12X100MM

## (undated) DEVICE — DISSECTOR 5MM ENDOPATH

## (undated) DEVICE — SEE MEDLINE ITEM 156952

## (undated) DEVICE — ELECTRODE REM PLYHSV RETURN 9